# Patient Record
Sex: FEMALE | Race: AMERICAN INDIAN OR ALASKA NATIVE
[De-identification: names, ages, dates, MRNs, and addresses within clinical notes are randomized per-mention and may not be internally consistent; named-entity substitution may affect disease eponyms.]

---

## 2020-09-30 ENCOUNTER — HOSPITAL ENCOUNTER (EMERGENCY)
Dept: HOSPITAL 5 - ED | Age: 30
Discharge: LEFT BEFORE DISCHARGE | End: 2020-09-30
Payer: SELF-PAY

## 2020-09-30 VITALS — DIASTOLIC BLOOD PRESSURE: 55 MMHG | SYSTOLIC BLOOD PRESSURE: 127 MMHG

## 2020-09-30 DIAGNOSIS — Z53.21: ICD-10-CM

## 2020-09-30 DIAGNOSIS — N76.0: Primary | ICD-10-CM

## 2025-05-10 PROBLEM — A59.9 TRICHOMONIASIS: Status: ACTIVE | Noted: 2024-10-14

## 2025-05-10 PROBLEM — J45.20 MILD INTERMITTENT ASTHMA: Status: ACTIVE | Noted: 2024-10-14

## 2025-05-10 PROBLEM — Z86.19 HISTORY OF SYPHILIS: Status: ACTIVE | Noted: 2024-10-14

## 2025-05-10 PROBLEM — F41.1 GENERALIZED ANXIETY DISORDER: Chronic | Status: RESOLVED | Noted: 2023-09-27 | Resolved: 2025-05-10

## 2025-05-10 PROBLEM — F43.10 POSTTRAUMATIC STRESS DISORDER: Status: RESOLVED | Noted: 2023-09-27 | Resolved: 2025-05-10

## 2025-05-10 PROBLEM — A59.9 TRICHOMONIASIS: Status: RESOLVED | Noted: 2024-10-14 | Resolved: 2025-05-10

## 2025-05-10 PROBLEM — T14.91XA ATTEMPTED SUICIDE (MULTI): Status: ACTIVE | Noted: 2024-10-14

## 2025-05-10 PROBLEM — T39.1X1A: Status: ACTIVE | Noted: 2024-10-14

## 2025-05-10 PROBLEM — F41.1 GENERALIZED ANXIETY DISORDER: Chronic | Status: ACTIVE | Noted: 2023-09-27

## 2025-05-10 PROBLEM — F43.10 POSTTRAUMATIC STRESS DISORDER: Status: ACTIVE | Noted: 2023-09-27

## 2025-05-10 PROBLEM — F32.9 MAJOR DEPRESSIVE DISORDER: Chronic | Status: ACTIVE | Noted: 2023-09-27

## 2025-05-10 PROBLEM — F32.9 MAJOR DEPRESSIVE DISORDER: Chronic | Status: RESOLVED | Noted: 2023-09-27 | Resolved: 2025-05-10

## 2025-05-10 PROBLEM — I72.0 CAROTID PSEUDOANEURYSM: Status: ACTIVE | Noted: 2025-05-10
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
## 2025-05-13 ENCOUNTER — INITIAL PRENATAL (OUTPATIENT)
Dept: OBSTETRICS AND GYNECOLOGY | Facility: CLINIC | Age: 35
End: 2025-05-13
Payer: COMMERCIAL

## 2025-05-13 ENCOUNTER — APPOINTMENT (OUTPATIENT)
Dept: LAB | Facility: HOSPITAL | Age: 35
End: 2025-05-13
Payer: COMMERCIAL

## 2025-05-13 VITALS — SYSTOLIC BLOOD PRESSURE: 112 MMHG | WEIGHT: 189 LBS | BODY MASS INDEX: 33.57 KG/M2 | DIASTOLIC BLOOD PRESSURE: 69 MMHG

## 2025-05-13 DIAGNOSIS — Z72.0 TOBACCO USE: ICD-10-CM

## 2025-05-13 DIAGNOSIS — O09.299: ICD-10-CM

## 2025-05-13 DIAGNOSIS — I72.9 PSEUDOANEURYSM: ICD-10-CM

## 2025-05-13 DIAGNOSIS — F11.10: ICD-10-CM

## 2025-05-13 DIAGNOSIS — R87.619 ABNORMAL CERVICAL PAPANICOLAOU SMEAR, UNSPECIFIED ABNORMAL PAP FINDING: ICD-10-CM

## 2025-05-13 DIAGNOSIS — Z3A.01 6 WEEKS GESTATION OF PREGNANCY (HHS-HCC): Primary | ICD-10-CM

## 2025-05-13 DIAGNOSIS — O99.340 DEPRESSION AFFECTING PREGNANCY (HHS-HCC): ICD-10-CM

## 2025-05-13 DIAGNOSIS — Z32.01 PREGNANCY TEST POSITIVE (HHS-HCC): ICD-10-CM

## 2025-05-13 DIAGNOSIS — O99.311: ICD-10-CM

## 2025-05-13 DIAGNOSIS — F32.A DEPRESSION AFFECTING PREGNANCY (HHS-HCC): ICD-10-CM

## 2025-05-13 DIAGNOSIS — Z12.4 SCREENING FOR MALIGNANT NEOPLASM OF CERVIX: ICD-10-CM

## 2025-05-13 DIAGNOSIS — O09.91 SUPERVISION OF HIGH RISK PREGNANCY IN FIRST TRIMESTER (HHS-HCC): ICD-10-CM

## 2025-05-13 DIAGNOSIS — Z59.41 FOOD INSECURITY: ICD-10-CM

## 2025-05-13 DIAGNOSIS — Z86.19 HISTORY OF SYPHILIS: ICD-10-CM

## 2025-05-13 PROBLEM — T14.91XA ATTEMPTED SUICIDE (MULTI): Status: RESOLVED | Noted: 2024-10-14 | Resolved: 2025-05-13

## 2025-05-13 PROBLEM — T39.1X1A: Status: RESOLVED | Noted: 2024-10-14 | Resolved: 2025-05-13

## 2025-05-13 PROBLEM — I72.0 CAROTID PSEUDOANEURYSM: Status: RESOLVED | Noted: 2025-05-10 | Resolved: 2025-05-13

## 2025-05-13 LAB
ABO GROUP (TYPE) IN BLOOD: NORMAL
ANTIBODY SCREEN: NORMAL
ERYTHROCYTE [DISTWIDTH] IN BLOOD BY AUTOMATED COUNT: 12.5 % (ref 11.5–14.5)
HCT VFR BLD AUTO: 33.6 % (ref 36–46)
HGB BLD-MCNC: 11.1 G/DL (ref 12–16)
MCH RBC QN AUTO: 28.5 PG (ref 26–34)
MCHC RBC AUTO-ENTMCNC: 33 G/DL (ref 32–36)
MCV RBC AUTO: 86 FL (ref 80–100)
NRBC BLD-RTO: 0 /100 WBCS (ref 0–0)
PLATELET # BLD AUTO: 342 X10*3/UL (ref 150–450)
RBC # BLD AUTO: 3.9 X10*6/UL (ref 4–5.2)
REFLEX ADDED, ANEMIA PANEL: NORMAL
RH FACTOR (ANTIGEN D): NORMAL
WBC # BLD AUTO: 7.9 X10*3/UL (ref 4.4–11.3)

## 2025-05-13 PROCEDURE — 85027 COMPLETE CBC AUTOMATED: CPT

## 2025-05-13 PROCEDURE — 86850 RBC ANTIBODY SCREEN: CPT

## 2025-05-13 PROCEDURE — 99214 OFFICE O/P EST MOD 30 MIN: CPT | Mod: 25 | Performed by: ADVANCED PRACTICE MIDWIFE

## 2025-05-13 PROCEDURE — 86900 BLOOD TYPING SEROLOGIC ABO: CPT

## 2025-05-13 PROCEDURE — 96160 PT-FOCUSED HLTH RISK ASSMT: CPT | Performed by: ADVANCED PRACTICE MIDWIFE

## 2025-05-13 PROCEDURE — 99214 OFFICE O/P EST MOD 30 MIN: CPT | Performed by: ADVANCED PRACTICE MIDWIFE

## 2025-05-13 PROCEDURE — 86901 BLOOD TYPING SEROLOGIC RH(D): CPT

## 2025-05-13 PROCEDURE — 87491 CHLMYD TRACH DNA AMP PROBE: CPT | Performed by: ADVANCED PRACTICE MIDWIFE

## 2025-05-13 PROCEDURE — 83021 HEMOGLOBIN CHROMOTOGRAPHY: CPT

## 2025-05-13 PROCEDURE — 87661 TRICHOMONAS VAGINALIS AMPLIF: CPT | Performed by: ADVANCED PRACTICE MIDWIFE

## 2025-05-13 PROCEDURE — 99078 GROUP HEALTH EDUCATION: CPT | Performed by: ADVANCED PRACTICE MIDWIFE

## 2025-05-13 RX ORDER — PYRIDOXINE HCL (VITAMIN B6) 25 MG
25 TABLET ORAL 3 TIMES DAILY PRN
Qty: 90 TABLET | Refills: 3 | Status: SHIPPED | OUTPATIENT
Start: 2025-05-13 | End: 2025-06-12

## 2025-05-13 RX ORDER — IBUPROFEN 200 MG
1 TABLET ORAL EVERY 24 HOURS
Qty: 30 PATCH | Refills: 3 | Status: SHIPPED | OUTPATIENT
Start: 2025-05-13 | End: 2025-06-12

## 2025-05-13 RX ORDER — SERTRALINE HYDROCHLORIDE 25 MG/1
25 TABLET, FILM COATED ORAL DAILY
Qty: 90 TABLET | Refills: 1 | Status: SHIPPED | OUTPATIENT
Start: 2025-05-13 | End: 2026-05-13

## 2025-05-13 SDOH — ECONOMIC STABILITY: FOOD INSECURITY: WITHIN THE PAST 12 MONTHS, YOU WORRIED THAT YOUR FOOD WOULD RUN OUT BEFORE YOU GOT MONEY TO BUY MORE.: NEVER TRUE

## 2025-05-13 SDOH — ECONOMIC STABILITY: FOOD INSECURITY: WITHIN THE PAST 12 MONTHS, THE FOOD YOU BOUGHT JUST DIDN'T LAST AND YOU DIDN'T HAVE MONEY TO GET MORE.: SOMETIMES TRUE

## 2025-05-13 SDOH — ECONOMIC STABILITY - FOOD INSECURITY: FOOD INSECURITY: Z59.41

## 2025-05-13 ASSESSMENT — EDINBURGH POSTNATAL DEPRESSION SCALE (EPDS)
TOTAL SCORE: 14
I HAVE BEEN ABLE TO LAUGH AND SEE THE FUNNY SIDE OF THINGS: AS MUCH AS I ALWAYS COULD
I HAVE BLAMED MYSELF UNNECESSARILY WHEN THINGS WENT WRONG: YES, SOME OF THE TIME
I HAVE BEEN SO UNHAPPY THAT I HAVE BEEN CRYING: ONLY OCCASIONALLY
I HAVE LOOKED FORWARD WITH ENJOYMENT TO THINGS: RATHER LESS THAN I USED TO
I HAVE FELT SCARED OR PANICKY FOR NO GOOD REASON: YES, SOMETIMES
THINGS HAVE BEEN GETTING ON TOP OF ME: YES, SOMETIMES I HAVEN'T BEEN COPING AS WELL AS USUAL
I HAVE FELT SAD OR MISERABLE: YES, MOST OF THE TIME
I HAVE BEEN ANXIOUS OR WORRIED FOR NO GOOD REASON: HARDLY EVER
I HAVE BEEN SO UNHAPPY THAT I HAVE HAD DIFFICULTY SLEEPING: YES, SOMETIMES
THE THOUGHT OF HARMING MYSELF HAS OCCURRED TO ME: NEVER

## 2025-05-13 NOTE — LETTER
May 13, 2025     Patient: Cintia Justin   YOB: 1990   Date of Visit: 5/13/2025       To Whom It May Concern:    Cintia Justin was seen in my clinic on 5/13/2025 at 9:00 am. Please excuse Cintia for her absence from work on this day to make the appointment. Pt had a longer apt today. She did not get done until 1230PM    If you have any questions or concerns, please don't hesitate to call.         Sincerely,         CODI Huerta        CC: No Recipients

## 2025-05-14 ENCOUNTER — HOSPITAL ENCOUNTER (OUTPATIENT)
Dept: RADIOLOGY | Facility: CLINIC | Age: 35
Discharge: HOME | End: 2025-05-14
Payer: COMMERCIAL

## 2025-05-14 DIAGNOSIS — Z32.01 PREGNANCY TEST POSITIVE (HHS-HCC): ICD-10-CM

## 2025-05-14 DIAGNOSIS — O09.521 AMA (ADVANCED MATERNAL AGE) MULTIGRAVIDA 35+, FIRST TRIMESTER (HHS-HCC): ICD-10-CM

## 2025-05-14 DIAGNOSIS — O99.211 OBESITY AFFECTING PREGNANCY IN FIRST TRIMESTER (HHS-HCC): ICD-10-CM

## 2025-05-14 LAB
B-HCG SERPL-ACNC: ABNORMAL MIU/ML
C TRACH RRNA SPEC QL NAA+PROBE: NEGATIVE
EST. AVERAGE GLUCOSE BLD GHB EST-MCNC: 108 MG/DL
EST. AVERAGE GLUCOSE BLD GHB EST-SCNC: 6 MMOL/L
HBA1C MFR BLD: 5.4 %
HBV CORE AB SERPL QL IA: NORMAL
HBV SURFACE AB SERPL IA-ACNC: 50 MIU/ML
HBV SURFACE AG SERPL QL IA: NORMAL
HCV AB SERPL QL IA: NORMAL
HEMOGLOBIN A2: 2.7 % (ref 2–3.5)
HEMOGLOBIN A: 97 % (ref 95.8–98)
HEMOGLOBIN F: 0.3 % (ref 0–2)
HEMOGLOBIN IDENTIFICATION INTERPRETATION: NORMAL
HIV 1+2 AB+HIV1 P24 AG SERPL QL IA: NORMAL
N GONORRHOEA DNA SPEC QL PROBE+SIG AMP: NEGATIVE
PATH REVIEW-HGB IDENTIFICATION: NORMAL
RUBV IGG SERPL IA-ACNC: 3.39 INDEX
T PALLIDUM AB SER QL IA: NORMAL
T VAGINALIS RRNA SPEC QL NAA+PROBE: NEGATIVE

## 2025-05-14 PROCEDURE — 76801 OB US < 14 WKS SINGLE FETUS: CPT

## 2025-05-19 LAB
B-HCG SERPL-ACNC: ABNORMAL MIU/ML
EST. AVERAGE GLUCOSE BLD GHB EST-MCNC: 108 MG/DL
EST. AVERAGE GLUCOSE BLD GHB EST-SCNC: 6 MMOL/L
HBA1C MFR BLD: 5.4 %
HBV CORE AB SERPL QL IA: NORMAL
HBV SURFACE AB SERPL IA-ACNC: 50 MIU/ML
HBV SURFACE AG SERPL QL IA: NORMAL
HCV AB SERPL QL IA: NORMAL
HIV 1+2 AB+HIV1 P24 AG SERPL QL IA: NORMAL
RPR SER QL: ABNORMAL
RUBV IGG SERPL IA-ACNC: 3.39 INDEX
T PALLIDUM AB SER QL AGGL: REACTIVE
T PALLIDUM AB SER QL IA: POSITIVE

## 2025-05-20 ENCOUNTER — ROUTINE PRENATAL (OUTPATIENT)
Dept: OBSTETRICS AND GYNECOLOGY | Facility: CLINIC | Age: 35
End: 2025-05-20
Payer: COMMERCIAL

## 2025-05-20 VITALS — SYSTOLIC BLOOD PRESSURE: 113 MMHG | DIASTOLIC BLOOD PRESSURE: 74 MMHG | WEIGHT: 191 LBS | BODY MASS INDEX: 33.93 KG/M2

## 2025-05-20 DIAGNOSIS — F11.10: ICD-10-CM

## 2025-05-20 DIAGNOSIS — O26.891 VAGINAL DISCHARGE DURING PREGNANCY IN FIRST TRIMESTER (HHS-HCC): Primary | ICD-10-CM

## 2025-05-20 DIAGNOSIS — N89.8 VAGINAL DISCHARGE DURING PREGNANCY IN FIRST TRIMESTER (HHS-HCC): Primary | ICD-10-CM

## 2025-05-20 DIAGNOSIS — O99.331 MATERNAL TOBACCO USE IN FIRST TRIMESTER (HHS-HCC): ICD-10-CM

## 2025-05-20 DIAGNOSIS — O99.511 ASTHMA AFFECTING PREGNANCY IN FIRST TRIMESTER (HHS-HCC): ICD-10-CM

## 2025-05-20 DIAGNOSIS — Z3A.09 9 WEEKS GESTATION OF PREGNANCY (HHS-HCC): ICD-10-CM

## 2025-05-20 DIAGNOSIS — O21.9 NAUSEA AND VOMITING IN PREGNANCY PRIOR TO 22 WEEKS GESTATION: ICD-10-CM

## 2025-05-20 DIAGNOSIS — J45.909 ASTHMA AFFECTING PREGNANCY IN FIRST TRIMESTER (HHS-HCC): ICD-10-CM

## 2025-05-20 PROCEDURE — 99214 OFFICE O/P EST MOD 30 MIN: CPT | Mod: GC,TH

## 2025-05-20 PROCEDURE — 99214 OFFICE O/P EST MOD 30 MIN: CPT

## 2025-05-20 RX ORDER — METOCLOPRAMIDE 10 MG/1
10 TABLET ORAL 4 TIMES DAILY
Qty: 120 TABLET | Refills: 2 | Status: SHIPPED | OUTPATIENT
Start: 2025-05-20 | End: 2025-08-18

## 2025-05-20 RX ORDER — FLUCONAZOLE 150 MG/1
150 TABLET ORAL ONCE
Qty: 1 TABLET | Refills: 0 | Status: SHIPPED | OUTPATIENT
Start: 2025-05-20 | End: 2025-05-20

## 2025-05-20 RX ORDER — ALBUTEROL SULFATE 90 UG/1
2 INHALANT RESPIRATORY (INHALATION) EVERY 6 HOURS PRN
Qty: 18 G | Refills: 11 | Status: SHIPPED | OUTPATIENT
Start: 2025-05-20 | End: 2026-05-20

## 2025-05-20 ASSESSMENT — ENCOUNTER SYMPTOMS
CONSTITUTIONAL NEGATIVE: 0
DEPRESSION: 0
LOSS OF SENSATION IN FEET: 0
HEMATOLOGIC/LYMPHATIC NEGATIVE: 0
CARDIOVASCULAR NEGATIVE: 0
ALLERGIC/IMMUNOLOGIC NEGATIVE: 0
NEUROLOGICAL NEGATIVE: 0
MUSCULOSKELETAL NEGATIVE: 0
RESPIRATORY NEGATIVE: 0
EYES NEGATIVE: 0
GASTROINTESTINAL NEGATIVE: 0
ENDOCRINE NEGATIVE: 0
OCCASIONAL FEELINGS OF UNSTEADINESS: 0
PSYCHIATRIC NEGATIVE: 0

## 2025-05-20 ASSESSMENT — PATIENT HEALTH QUESTIONNAIRE - PHQ9
2. FEELING DOWN, DEPRESSED OR HOPELESS: NOT AT ALL
1. LITTLE INTEREST OR PLEASURE IN DOING THINGS: NOT AT ALL
SUM OF ALL RESPONSES TO PHQ9 QUESTIONS 1 AND 2: 0

## 2025-05-20 ASSESSMENT — COLUMBIA-SUICIDE SEVERITY RATING SCALE - C-SSRS
6. HAVE YOU EVER DONE ANYTHING, STARTED TO DO ANYTHING, OR PREPARED TO DO ANYTHING TO END YOUR LIFE?: NO
2. HAVE YOU ACTUALLY HAD ANY THOUGHTS OF KILLING YOURSELF?: NO
1. IN THE PAST MONTH, HAVE YOU WISHED YOU WERE DEAD OR WISHED YOU COULD GO TO SLEEP AND NOT WAKE UP?: NO

## 2025-05-20 NOTE — ASSESSMENT & PLAN NOTE
Urine culture ordered  Patient having persistent nausea, Reglan prescribed  Desires genetic testing, [facility] ordered  Orders:    Urine Culture; Future    [facility] Prequel Prenatal Screen; Future    metoclopramide (Reglan) 10 mg tablet; Take 1 tablet (10 mg) by mouth 4 times a day.

## 2025-05-20 NOTE — PROGRESS NOTES
OB Follow-up  2025         SUBJECTIVE    HPI: Cintia Justin is a 34 y.o.  at 9w0d here due to concern for yeast infection. Denies cramping or vaginal bleeding. Reports vaginal itching and thick white vaginal discharge for 2 days. She recently complete antibiotic course for UTI, states still intermittently have dysuria. Also having nausea, not improved with B6 and Unisom. Has not taken Percocet since last visit.      OBJECTIVE    Visit Vitals  /74   Wt 86.6 kg (191 lb)   LMP 2025   BMI 33.93 kg/m²   OB Status Pregnant   Smoking Status Every Day   BSA 1.96 m²      : Erythematous labia minora, thick white vaginal discharge at introitus    ASSESSMENT & PLAN    Cintia Justin is a 34 y.o.  at  9w0d here for the following concerns we addressed today:    Assessment & Plan  9 weeks gestation of pregnancy ([facility])  Urine culture ordered  Patient having persistent nausea, Reglan prescribed  Desires genetic testing, [facility] ordered  Orders:    Urine Culture; Future    [facility] Prequel Prenatal Screen; Future    metoclopramide (Reglan) 10 mg tablet; Take 1 tablet (10 mg) by mouth 4 times a day.    Vaginal discharge during pregnancy in first trimester ([facility])  Clinical exam consistent with yeast infection, vaginitis swab obtain and Diflucan ordered  Orders:    Vaginitis Gram Stain For Bacterial Vaginosis + Yeast    fluconazole (Diflucan) 150 mg tablet; Take 1 tablet (150 mg) by mouth 1 time for 1 dose. Repeat in 7 days if symptoms persist.    Asthma affecting pregnancy in first trimester ([facility])  Desires refill on rescue inhaler, ordered  Orders:    albuterol 90 mcg/actuation inhaler; Inhale 2 puffs every 6 hours if needed for wheezing.    Maternal tobacco use in first trimester ([facility])  On nicotine patch        Nausea and vomiting in pregnancy prior to 22 weeks gestation  Reglan prescribed       Percocet use disorder, mild  Patient previously intermittent taking Percocet, states  not taking since last visit         RTC as scheduled, next OB appt 6/3    Patient seen and evaluated with Dr. Michelle Mcgowan MD PGY-3

## 2025-05-20 NOTE — ASSESSMENT & PLAN NOTE
Clinical exam consistent with yeast infection, vaginitis swab obtain and Diflucan ordered  Orders:    Vaginitis Gram Stain For Bacterial Vaginosis + Yeast    fluconazole (Diflucan) 150 mg tablet; Take 1 tablet (150 mg) by mouth 1 time for 1 dose. Repeat in 7 days if symptoms persist.

## 2025-05-21 LAB — BV SCORE VAG QL: ABNORMAL

## 2025-05-22 ENCOUNTER — TELEPHONE (OUTPATIENT)
Dept: OBSTETRICS AND GYNECOLOGY | Facility: CLINIC | Age: 35
End: 2025-05-22
Payer: COMMERCIAL

## 2025-05-22 DIAGNOSIS — O23.41 URINARY TRACT INFECTION IN MOTHER DURING FIRST TRIMESTER OF PREGNANCY (HHS-HCC): Primary | ICD-10-CM

## 2025-05-22 DIAGNOSIS — O23.599 BACTERIAL VAGINOSIS IN PREGNANCY (HHS-HCC): ICD-10-CM

## 2025-05-22 DIAGNOSIS — B96.89 BACTERIAL VAGINOSIS IN PREGNANCY (HHS-HCC): ICD-10-CM

## 2025-05-22 RX ORDER — METRONIDAZOLE 7.5 MG/G
GEL VAGINAL DAILY
Qty: 70 G | Refills: 0 | Status: SHIPPED | OUTPATIENT
Start: 2025-05-22 | End: 2025-05-27

## 2025-05-22 RX ORDER — CEPHALEXIN 500 MG/1
500 CAPSULE ORAL 4 TIMES DAILY
Qty: 28 CAPSULE | Refills: 0 | Status: SHIPPED | OUTPATIENT
Start: 2025-05-22 | End: 2025-05-29

## 2025-05-22 NOTE — TELEPHONE ENCOUNTER
Pt notified of uti and of + bv and that rxs were sent----- Message from Nurse Mary Lou DOLAN sent at 5/21/2025 11:43 AM EDT -----  Regarding: test results  Contact: [phone]  Cintia Justin 1990  [phone]. Requesting urinalysis results. Thank you.

## 2025-05-23 LAB — BACTERIA UR CULT: ABNORMAL

## 2025-05-27 ENCOUNTER — TELEPHONE (OUTPATIENT)
Dept: GENETICS | Facility: CLINIC | Age: 35
End: 2025-05-27

## 2025-05-27 ENCOUNTER — TELEPHONE (OUTPATIENT)
Dept: OBSTETRICS AND GYNECOLOGY | Facility: CLINIC | Age: 35
End: 2025-05-27
Payer: COMMERCIAL

## 2025-05-27 ENCOUNTER — HOSPITAL ENCOUNTER (OUTPATIENT)
Dept: RADIOLOGY | Facility: CLINIC | Age: 35
Discharge: HOME | End: 2025-05-27
Payer: COMMERCIAL

## 2025-05-27 ENCOUNTER — CLINICAL SUPPORT (OUTPATIENT)
Dept: GENETICS | Facility: CLINIC | Age: 35
End: 2025-05-27
Payer: COMMERCIAL

## 2025-05-27 VITALS
BODY MASS INDEX: 34.14 KG/M2 | DIASTOLIC BLOOD PRESSURE: 75 MMHG | WEIGHT: 192.2 LBS | SYSTOLIC BLOOD PRESSURE: 113 MMHG | HEART RATE: 89 BPM

## 2025-05-27 DIAGNOSIS — O26.851 SPOTTING COMPLICATING PREGNANCY, FIRST TRIMESTER (HHS-HCC): ICD-10-CM

## 2025-05-27 DIAGNOSIS — O09.521 MULTIGRAVIDA OF ADVANCED MATERNAL AGE IN FIRST TRIMESTER (HHS-HCC): ICD-10-CM

## 2025-05-27 DIAGNOSIS — Z3A.01 6 WEEKS GESTATION OF PREGNANCY (HHS-HCC): ICD-10-CM

## 2025-05-27 PROCEDURE — 76816 OB US FOLLOW-UP PER FETUS: CPT | Performed by: OBSTETRICS & GYNECOLOGY

## 2025-05-27 PROCEDURE — 76816 OB US FOLLOW-UP PER FETUS: CPT

## 2025-05-27 PROCEDURE — GENMD PR GENETICS VISIT (MEDICAID/MEDICARE): Performed by: GENETIC COUNSELOR, MS

## 2025-05-27 NOTE — PROGRESS NOTES
Cintia Justin is a 34 y.o. old,  TAB5 (1 ectopic), female who was approximately 10 weeks and 0 days pregnant at the time of our appointment with an EDC of 25.  She was seen to discuss her genetic screening and testing options due to advanced maternal age.      PAST HISTORY:  The patient has a personal history of migraines, left ICA Pseudoaneurysm, GERD, and depression.  The patient has not had any screening or diagnostic testing for aneuploidy in her pregnancy thus far.    FAMILY HISTORY:  Medical and family histories were reviewed and the following concerns regarding this pregnancy were apparent:      Ms. Justin:  Son (from a previous relationship)- ventricular septal defect   Son (from a previous relationship)- G6PD deficiency   Maternal half-sister- migraines  Mother- heart disease, asthma, high blood pressure  Father- cancer, type unknown, diagnosed in his 60's,  in his 60's    Her partner, Damir:  Mother- diabetes     The remainder of the family history was negative for birth defects, intellectual disability, recurrent pregnancy loss, or recognized inherited conditions.  Consanguinity was denied.  The Pedigree is scanned in the Media tab and is available for a full review of the family history.      ANCESTRY:   The patient reported that she is of , , and  (countries of origin are unknown) ancestry.  She reported that her partner is of  ancestry.  Ashkenazi Pentecostal ancestry was denied.    We discussed the availability, benefits, and limitations of carrier screening for cystic fibrosis (CF), spinal muscular atrophy (SMA), and hemoglobinopathies/thalassemias. We reviewed the carrier frequencies of these conditions, varied clinical manifestations, and their autosomal recessive inheritance. The patient has already had negative carrier screening for hemoglobinopathies and thalassemias via CBC and hemoglobin electrophoresis and these  results were reviewed. If both members of the couple are found to be carriers for the same autosomal recessive condition, there is a 25% chance for an affected child and prenatal diagnosis would be available. Negative carrier screening does not rule out the possibility of being a carrier. [place] screening is available for CF, hemoglobinopathies, and thalassemias, and SMA.  We also discussed the availability of more expanded/pan ethnic carrier screening for additional, primarily autosomal recessive, conditions. We discussed the pros and cons of expanded carrier screening including the higher likelihood of being identified as a carrier for at least one condition on a larger panel. Approximately 4% of couples are found to be at risk to have a child with a genetic disorder based on this screening. In rare cases, expanded carrier screening results may have health implications for the tested individual.      After careful consideration, the patient declined all carrier screening.      COUNSELING:  The following information was discussed with your patient:    1. The general population risk of 3-5% for birth defects in every pregnancy.    2. Chromosomes, genes, non-disjunction, and common aneuploidies.    3.  Based on the age of 35 at EDC alone, at this gestation, the risk for the fetus to have Down syndrome is approximately 1 in 130.  The combined risk for the fetus to have Trisomy 21/18/13 is approximately 1 in 99.  This does not include copy number variation, mosaicism, single gene disorders, translocations, and marker chromosomes.     4. The availability, benefits and limitations of ultrasound study. An ultrasound study is recommended at 12-14 weeks gestation for assessment of nuchal translucency and again at 19-20 weeks gestation to survey fetal organs. An ultrasound study in the second trimester can identify 50% of Down syndrome cases and 80-90% of trisomy 18 or trisomy 13 cases.     5. The availability, benefits and  limitations of standard cell-free DNA screening.  We discussed the methodology for this screen, which includes using cell-free DNA obtained from a mother's blood (derived from the placenta) to screen for the presence of common chromosomal abnormalities. Depending on the laboratory used, there is a >99% sensitivity for Down syndrome, at least 97.4% sensitivity for trisomy 18, and at least 91% sensitivity for trisomy 13.  Specificity for these trisomies is >99%. Although sensitivity and specificity rates are high, in our experience the positive predictive value is dependent on many factors; whereas false negative results are rare.  In addition, anticoagulants, maternal chromosome abnormality, fibroids or malignancy may impact results and/or be associated with inconclusive or other abnormal findings.  This is not a diagnostic test.  Therefore, in the event of an abnormal result, prenatal diagnosis through amniocentesis is recommended to confirm the findings, if confirmation is desired.  Results take approximately 7-10 days.      6. The methods, benefits, limitations and risks of CVS.  There is an approximate 1 in 200 risk of complications including a 1 in 400 risk for miscarriage. The approximate 1% risk of confined placental mosaicism in CVS was discussed.     7. The methods, benefits, limitations and risks of amniocentesis.  There is an approximate 1 in 400 risk of complications including a 1 in 800 risk of miscarriage.    8. The patient has a personal history of a left ICA Pseudoaneurysm.  Pseudoaneurysms may be caused by trauma or surgical procedures.  Sometimes pseudoaneurysms are associated with genetic disorders like Marfan syndrome, Sukumar-Danlos syndrome, etc.  We recommend that the patient be evaluated by a vascular specialist to determine the appropriate management for the pseudoaneurysm  We recommend that the patient have genetic counseling in the  [facility] with genetic specialist in aneurysms.  The  "patient was scheduled for this evaluation on 7/7/25.      9. Additional Family History Information:  The patient reported that she has a son with Glucose-6-phosphate dehydrogenase (G6PD) deficiency and is a known carrier of G6PD deficiency. Up to 24% of  Americans have a mutation in G6PD. G6PD deficiency is an X-linked recessive disorder. Males, who have only one X chromosome will have G6PD deficiency with one altered copy of the gene. Females, who have two X chromosomes, need a mutation to occur in both copies of the gene to cause the disorder.  The patient reported that she a carrier of G6PD.  If the patient is a carrier of G6PD, there is a 50% chance for each pregnancy for sons to have G6PD deficiency and a 50% chance per each pregnancy for her daughters to be carriers of G6PD deficiency. If the patient has two genetic changes for G6PD deficiency, then all of her children would inherit a single G6PD mutation (and all of her sons would be affected and all of her daughters would be carriers).  This is assuming that their father does not carrier a G6PD mutation. \"The clinical expression of G6PD variants encompasses a spectrum of hemolytic syndromes. Affected patients are most often asymptomatic, but many patients have episodic anemia, while a few have chronic hemolysis. With most G6PD variants, hemolysis is induced in children and adults by exposure to drugs having a high redox potential (including the antimalarial drug primaquine and certain sulfa drugs) or to jc beans, selected infections, or metabolic abnormalities.\" (UpToDate 2018).  The patient was offered carrier screening for G6PD deficiency and declined.  The patient should share this information with her primary OBGYN and primary care provider. She should also share this information with all of her children's Pediatricians.     The patient's son (from a previous relationship) was born with a congenital heart defect. A congenital heart defect may be " an isolated finding or one feature of a chromosomal condition, single gene disorder, or multiple malformation syndrome. When isolated (occurring alone), congenital heart defects are often considered multifactorial in origin, involving a combination of genetic, environmental and unknown factors. If the defect is isolated, the risk for close relatives is increased.  If isolated, the risk for the couple's offspring to have a congenital heart defect is approximately 1-2% compared to the general population risk of 0.5-1%.  If the defect is due to a chromosomal condition, single gene disorder, or multiple malformation syndrome, the risk to relatives may be significantly higher.     The patient has a personal history of depression and asthma.  The patient has a family history of migraines, heart disease, asthma and high blood pressure.  The patient's partner has a family history of diabetes. Often, these conditions are due to a combination of genetic and environmental factors (which often remain unknown).  The risk to have them often depends on the amount and degree of affected family members.  It also depends on if an underlying genetic cause of these conditions can be identified.   With this history, the couple and their offspring are at an increased risk for the disorders in their respective families and this information should be shared with all relevant primary care providers.        DISPOSITION:  The patient stated that she understood the above information and elected to proceed with standard cell-free DNA screening to [facility] for common aneuploidies.  All carrier screening was declined.  Diagnostic testing in the form of CVS and genetic amniocentesis was declined.      Aneurysm genetic specialist Dr. Melissa Salazar was contacted (with the patient's permission) and recommended that the patient be evaluated in the  [facility] on 7/7.  In this clinic an echocardiogram is performed and any clinically indicated  genetic testing will be initiated.  We also recommend that the patient have a consultation with Dr. Tsering Brooks (following her genetic evaluation) to determine if any other vascular imaging is recommended.      We recommend a NT ultrasound at 12 weeks.  We recommend an anatomy ultrasound at 19 weeks.    Thank you for allowing us to participate in the care of your patient.  Should you or your patient have any questions, please do not hesitate to contact our office at [phone].    Licensed Genetic Counselor Stella He MS, [facility] spent 51 total minutes on the day of the encounter for patient care (35 minutes with patient, 16 minutes on pre/post patient care activities, including documentation).    Sincerely,    Stella He MS  Licensed Genetic Counselor    Reviewed by:  Dr. James Hurtado MD  Maternal Fetal Medicine Specialist

## 2025-05-27 NOTE — TELEPHONE ENCOUNTER
Pt walked into clinic stating she is bleeding and cramping. Pt states bleeding is bright red. Discussed with dr bain. Pt to be added on for ultrasound. Pt also has a genetics appointment. Discussed with genetics and ultrasound and pt.

## 2025-05-28 ENCOUNTER — LAB (OUTPATIENT)
Dept: LAB | Facility: HOSPITAL | Age: 35
End: 2025-05-28
Payer: COMMERCIAL

## 2025-05-28 ENCOUNTER — APPOINTMENT (OUTPATIENT)
Facility: HOSPITAL | Age: 35
End: 2025-05-28
Payer: COMMERCIAL

## 2025-05-28 DIAGNOSIS — O09.521 SUPERVISION OF ELDERLY MULTIGRAVIDA, FIRST TRIMESTER: Primary | ICD-10-CM

## 2025-05-28 LAB
CYTOLOGY CMNT CVX/VAG CYTO-IMP: NORMAL
HPV HR 12 DNA GENITAL QL NAA+PROBE: NEGATIVE
HPV HR GENOTYPES PNL CVX NAA+PROBE: NEGATIVE
HPV16 DNA SPEC QL NAA+PROBE: NEGATIVE
HPV18 DNA SPEC QL NAA+PROBE: NEGATIVE
LAB AP HPV GENOTYPE QUESTION: YES
LAB AP HPV HR: NORMAL
LAB AP PAP ADDITIONAL TESTS: NORMAL
LAB AP PREVIOUS ABNORMAL HISTORY: NORMAL
LABORATORY COMMENT REPORT: NORMAL
LMP START DATE: NORMAL
MENSTRUAL HX REPORTED: NORMAL
PATH REPORT.TOTAL CANCER: NORMAL

## 2025-06-03 ENCOUNTER — ROUTINE PRENATAL (OUTPATIENT)
Dept: OBSTETRICS AND GYNECOLOGY | Facility: CLINIC | Age: 35
End: 2025-06-03
Payer: COMMERCIAL

## 2025-06-03 VITALS
BODY MASS INDEX: 34.07 KG/M2 | WEIGHT: 191.8 LBS | HEART RATE: 91 BPM | SYSTOLIC BLOOD PRESSURE: 113 MMHG | DIASTOLIC BLOOD PRESSURE: 71 MMHG

## 2025-06-03 DIAGNOSIS — O26.891 VAGINAL DISCHARGE DURING PREGNANCY IN FIRST TRIMESTER (HHS-HCC): ICD-10-CM

## 2025-06-03 DIAGNOSIS — F32.A DEPRESSION AFFECTING PREGNANCY (HHS-HCC): ICD-10-CM

## 2025-06-03 DIAGNOSIS — N89.8 VAGINAL DISCHARGE DURING PREGNANCY IN FIRST TRIMESTER (HHS-HCC): ICD-10-CM

## 2025-06-03 DIAGNOSIS — O09.891 SUPERVISION OF OTHER HIGH RISK PREGNANCIES, FIRST TRIMESTER (HHS-HCC): ICD-10-CM

## 2025-06-03 DIAGNOSIS — O99.340 DEPRESSION AFFECTING PREGNANCY (HHS-HCC): ICD-10-CM

## 2025-06-03 DIAGNOSIS — O21.9 NAUSEA AND VOMITING IN PREGNANCY PRIOR TO 22 WEEKS GESTATION: ICD-10-CM

## 2025-06-03 DIAGNOSIS — O99.331 MATERNAL TOBACCO USE IN FIRST TRIMESTER (HHS-HCC): ICD-10-CM

## 2025-06-03 DIAGNOSIS — Z3A.11 11 WEEKS GESTATION OF PREGNANCY (HHS-HCC): Primary | ICD-10-CM

## 2025-06-03 PROCEDURE — 99213 OFFICE O/P EST LOW 20 MIN: CPT | Mod: TH | Performed by: ADVANCED PRACTICE MIDWIFE

## 2025-06-03 PROCEDURE — 99406 BEHAV CHNG SMOKING 3-10 MIN: CPT | Performed by: ADVANCED PRACTICE MIDWIFE

## 2025-06-03 PROCEDURE — 99408 AUDIT/DAST 15-30 MIN: CPT | Performed by: ADVANCED PRACTICE MIDWIFE

## 2025-06-03 PROCEDURE — 99213 OFFICE O/P EST LOW 20 MIN: CPT | Performed by: ADVANCED PRACTICE MIDWIFE

## 2025-06-03 RX ORDER — NAPROXEN SODIUM 220 MG/1
81 TABLET, FILM COATED ORAL NIGHTLY
Qty: 90 TABLET | Refills: 3 | Status: SHIPPED | OUTPATIENT
Start: 2025-06-03 | End: 2025-09-01

## 2025-06-03 ASSESSMENT — PAIN SCALES - GENERAL: PAINLEVEL_OUTOF10: 0 - NO PAIN

## 2025-06-03 ASSESSMENT — ENCOUNTER SYMPTOMS
CARDIOVASCULAR NEGATIVE: 0
CONSTITUTIONAL NEGATIVE: 0
HEMATOLOGIC/LYMPHATIC NEGATIVE: 0
GASTROINTESTINAL NEGATIVE: 0
ENDOCRINE NEGATIVE: 0
NEUROLOGICAL NEGATIVE: 0
ALLERGIC/IMMUNOLOGIC NEGATIVE: 0
MUSCULOSKELETAL NEGATIVE: 0
EYES NEGATIVE: 0
RESPIRATORY NEGATIVE: 0
PSYCHIATRIC NEGATIVE: 0

## 2025-06-03 ASSESSMENT — PATIENT HEALTH QUESTIONNAIRE - PHQ9
SUM OF ALL RESPONSES TO PHQ9 QUESTIONS 1 AND 2: 0
1. LITTLE INTEREST OR PLEASURE IN DOING THINGS: NOT AT ALL
2. FEELING DOWN, DEPRESSED OR HOPELESS: NOT AT ALL

## 2025-06-03 ASSESSMENT — PAIN - FUNCTIONAL ASSESSMENT: PAIN_FUNCTIONAL_ASSESSMENT: 0-10

## 2025-06-03 NOTE — PROGRESS NOTES
Subjective   Cintia Justin is a 34 y.o.  at 11w0d with a working estimated date of delivery of 2025, by Ultrasound who presents for a routine prenatal visit. She also has had thoughts of terminating pregnancy, had an appointment at , but then canceled it.     Patients stated mood is overall okay. She stopped taking the zoloft because she thought that may be contributing to her tiredness.     Patient reports that she has been taking reglan 4x a day and is still experiencing some nausea.     She is also still having thick discharge that feels like a yeast infection associated with external vaginal itching and burning.     Currently quit drinking alcohol. She stated that she thought about having a drink but thinks about what was said in our centering counseling group and decides not to. She also stopped taking percocet.    Patient reports changing nicorette patch once a week. She is experiencing less cravings and has cut down on smoking black and mild's.     She denies vaginal bleeding, abdominal pain, leakage of fluid.     Objective   Physical Exam  Weight: 87 kg (191 lb 12.8 oz), Pregravid BMI: 31.97  Expected Total Weight Gain: 5 kg (11 lb)-9 kg (19 lb)   BP: 113/71         Problem List Items Addressed This Visit       Depression affecting pregnancy ([facility])    Overview   EPDS 14 at new OB  Was on zoloft before, ran out of medication, would like refills; sent 25  Declines  provider referral   6/3: Stopped taking zoloft after a week due to feelings of tiredness; discussed with patient that the effectiveness of zoloft can take at least 4 weeks. Explained to patient that the tiredness is most likely due to her being in her 1st trimester of pregnancy rather than the side effects of medication.          Maternal tobacco use in first trimester ([facility])    Overview   Smoking a pack of black and milds daily  Interested in quitting  Nicorette patch rx sent   6/3: informed patient on correct  usage of patch; she is amendable to changing patch every 24 hours instead of once a week.          11 weeks gestation of pregnancy ([facility]) - Primary    Overview   Desired provider in labor: [] CNM  [] Physician   [] Either Acceptable  [x] Blood Products: [x] Yes, accepts [] No, needs counseling  [x] Initial BMI: 31.97   [x] Prenatal Labs: WNL  [x] Cervical Cancer Screening up to date: 5/13/25   [x] Rh status: B+  [x] Screen for IPV and Substance Use Risk: WNL at new OB  [x] Genetic Screening (cfDNA):  ordered 5/27  [] First Trimester Anatomy Screen (11-13.6 wks):  [x] Baby ASA (initiated): rx sent at 11w  [x] Pregnancy dated by: 8w US    [] Anatomy US: (19-20 wks)  [] Federal Sterilization consent signed (if indicated):  [] 1hr GCT at 24-28wks:  [] Rhogam (if indicated):   [] Fetal Surveillance (if indicated):  [] Tdap (27-32 wks, may be given up to 36 wks if initial window missed):   [] RSV (32-36 wks) (Sept. to end of Jan):     [] Feeding Intentions:  [] Postpartum Birth control method:   [] GBS at 36 - 37 wks:  [] 39 weeks discussion of IOL vs. Expectant management:  [] Mode of delivery ( anticipated ):           Relevant Medications    aspirin 81 mg chewable tablet    Other Relevant Orders    Vaginitis Gram Stain For Bacterial Vaginosis + Yeast    Nausea and vomiting in pregnancy prior to 22 weeks gestation    Overview   Taking reglan 4x a day but not consistent with vit B-6 and Unisom.   Discussed with patient that taking reglan alone will not resolve her nausea.   Patient amendable to start taking medications as prescribed.             Other Visit Diagnoses         Vaginal discharge during pregnancy in first trimester ([facility])        Relevant Medications    aspirin 81 mg chewable tablet    Other Relevant Orders    Vaginitis Gram Stain For Bacterial Vaginosis + Yeast            -Start bASA for PEC prophylaxis  -Anatomy US ordered for 6/17  -Reviewed reasons to call CNM on-call: vaginal bleeding, loss of  fluid, severe pelvic pain, or any questions/concerns  -RTC in 4 weeks or prn  *next visit: smoking cessation, mood    SANDOUS NAJJAR     I was present with the PA student who participated in the documentation of this note. I have personally seen and re-examined the patient and performed the medical decision-making components (assessment and plan of care). I have reviewed the PA student documentation and verified the findings in the note as written with additions or exceptions as stated in the body of this note.    CODI Huerta

## 2025-06-04 LAB — BV SCORE VAG QL: ABNORMAL

## 2025-06-04 NOTE — TELEPHONE ENCOUNTER
Called patient to disclose normal cell free DNA results; screened negative for Trisomy 13, Trisomy 18, Trisomy 21, and sex chromosome aneuploidies. <1 in 10,000 chance of fetus being affected with these syndromes. Reviewed limitations, including that there are other possible genetic conditions that could affect a fetus not covered by this testing. Patient elected to learn predicted fetal sex, which is female.    If additional concerns arise, patient was notified that diagnostic testing is still available throughout the pregnancy. She declined additional testing at this time.     Please reach out to me at [phone] if you or your patient have any additional questions.     Lauren Hamm MS, [facility]  Licensed Genetic Counselor

## 2025-06-04 NOTE — TELEPHONE ENCOUNTER
Left message for patient requesting call back to review additional questions she had about genetic results.    Sincerely,   Laurne Hamm MS, [facility]  Licensed and Certified Genetic Counselor   [phone]

## 2025-06-05 LAB
COMMENTS - MP RESULT TYPE: NORMAL
SCAN RESULT: NORMAL

## 2025-06-06 ENCOUNTER — TELEPHONE (OUTPATIENT)
Dept: OBSTETRICS AND GYNECOLOGY | Facility: CLINIC | Age: 35
End: 2025-06-06

## 2025-06-06 ENCOUNTER — TELEPHONE (OUTPATIENT)
Dept: OBSTETRICS AND GYNECOLOGY | Facility: CLINIC | Age: 35
End: 2025-06-06
Payer: COMMERCIAL

## 2025-06-06 DIAGNOSIS — B96.89 BV (BACTERIAL VAGINOSIS): Primary | ICD-10-CM

## 2025-06-06 DIAGNOSIS — N76.0 BV (BACTERIAL VAGINOSIS): Primary | ICD-10-CM

## 2025-06-06 RX ORDER — METRONIDAZOLE 500 MG/1
500 TABLET ORAL 2 TIMES DAILY
Qty: 14 TABLET | Refills: 0 | Status: SHIPPED | OUTPATIENT
Start: 2025-06-06 | End: 2025-06-13

## 2025-06-06 NOTE — TELEPHONE ENCOUNTER
Called pt to see if she was doing better than she was the last time I spoke with her. She was in fear that she was going to lose her employment do to morning sickness. 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 Told her to reach out if anything changes I would be happy to put her in a group

## 2025-06-06 NOTE — TELEPHONE ENCOUNTER
Pt notified----- Message from Lauryn Thompson sent at 6/6/2025  8:45 AM EDT -----  BV; metronidazole sent   ----- Message -----  From: [facility] Results In  Sent: 6/4/2025   3:00 PM EDT  To: CODI Huerta

## 2025-06-16 ENCOUNTER — TELEPHONE (OUTPATIENT)
Dept: OBSTETRICS AND GYNECOLOGY | Facility: CLINIC | Age: 35
End: 2025-06-16
Payer: COMMERCIAL

## 2025-06-16 ENCOUNTER — ROUTINE PRENATAL (OUTPATIENT)
Dept: OBSTETRICS AND GYNECOLOGY | Facility: CLINIC | Age: 35
End: 2025-06-16
Payer: COMMERCIAL

## 2025-06-16 VITALS — SYSTOLIC BLOOD PRESSURE: 105 MMHG | DIASTOLIC BLOOD PRESSURE: 69 MMHG | WEIGHT: 189.9 LBS | BODY MASS INDEX: 33.73 KG/M2

## 2025-06-16 DIAGNOSIS — O26.891 VAGINAL DISCHARGE DURING PREGNANCY IN FIRST TRIMESTER (HHS-HCC): ICD-10-CM

## 2025-06-16 DIAGNOSIS — Z3A.12 12 WEEKS GESTATION OF PREGNANCY (HHS-HCC): ICD-10-CM

## 2025-06-16 DIAGNOSIS — J30.2 SEASONAL ALLERGIES: ICD-10-CM

## 2025-06-16 DIAGNOSIS — O23.41 URINARY TRACT INFECTION IN MOTHER DURING FIRST TRIMESTER OF PREGNANCY (HHS-HCC): ICD-10-CM

## 2025-06-16 DIAGNOSIS — N89.8 VAGINAL DISCHARGE DURING PREGNANCY IN FIRST TRIMESTER (HHS-HCC): ICD-10-CM

## 2025-06-16 DIAGNOSIS — O20.9 VAGINAL BLEEDING BEFORE 22 WEEKS GESTATION (HHS-HCC): ICD-10-CM

## 2025-06-16 DIAGNOSIS — Z34.81 ENCOUNTER FOR SUPERVISION OF OTHER NORMAL PREGNANCY IN FIRST TRIMESTER: Primary | ICD-10-CM

## 2025-06-16 PROBLEM — F11.21 OPIOID USE DISORDER, SEVERE, IN EARLY REMISSION: Status: ACTIVE | Noted: 2023-09-27

## 2025-06-16 PROBLEM — I72.9 PSEUDOANEURYSM: Status: ACTIVE | Noted: 2019-09-13

## 2025-06-16 PROCEDURE — 99213 OFFICE O/P EST LOW 20 MIN: CPT | Mod: TH | Performed by: ADVANCED PRACTICE MIDWIFE

## 2025-06-16 PROCEDURE — 99213 OFFICE O/P EST LOW 20 MIN: CPT | Performed by: ADVANCED PRACTICE MIDWIFE

## 2025-06-16 RX ORDER — CETIRIZINE HYDROCHLORIDE 10 MG/1
10 TABLET ORAL DAILY
Qty: 30 TABLET | Refills: 11 | Status: SHIPPED | OUTPATIENT
Start: 2025-06-16 | End: 2026-06-16

## 2025-06-16 ASSESSMENT — ENCOUNTER SYMPTOMS
MUSCULOSKELETAL NEGATIVE: 0
HEMATOLOGIC/LYMPHATIC NEGATIVE: 0
CARDIOVASCULAR NEGATIVE: 0
GASTROINTESTINAL NEGATIVE: 0
NEUROLOGICAL NEGATIVE: 0
ENDOCRINE NEGATIVE: 0
EYES NEGATIVE: 0
PSYCHIATRIC NEGATIVE: 0
RESPIRATORY NEGATIVE: 0
CONSTITUTIONAL NEGATIVE: 0
ALLERGIC/IMMUNOLOGIC NEGATIVE: 0

## 2025-06-16 NOTE — LETTER
June 16, 2025     Patient: Cintia Justin   YOB: 1990   Date of Visit: 6/16/2025       To Whom It May Concern:    Cintia Justin was seen in my clinic on 6/16/2025 at 4:15 pm. Please excuse Cintia for her absence from work on this day to make the appointment.    If you have any questions or concerns, please don't hesitate to call.         Sincerely,         Hannah Wahl, APRN-MAXWELL, APRN-CNP

## 2025-06-16 NOTE — TELEPHONE ENCOUNTER
Call from pt   12.6 wks  and reports since last Thursday she has been cramping and bleeding but today pain has caused her to leave work. She  sees blood when she wipes. Cramps are in lower back and legs. Pt discussed with Hannah Wahl CNM and pt to come in now- has an appt tomorrow but will see today.

## 2025-06-16 NOTE — PROGRESS NOTES
Subjective   Cintia Justin is a 34 y.o.  at 12w6d with a working estimated date of delivery of 2025, by Ultrasound who presents for a problem prenatal visit    Having bleeding and cramping. Cramping was very uncomfortable today, had to leave work. Had heavier bleeding last week on Thursday, now spotting. Known TRINH on US    Objective   Physical Exam  Weight: 86.1 kg (189 lb 14.4 oz)  TW.491 kg (9 lb 14.4 oz)   BP: 105/69 (pluse-93)  Fetal Heart Rate: 150    Physical Exam  Constitutional:       Appearance: Normal appearance.   Genitourinary:      Vulva and urethral meatus normal.      No lesions in the vagina.      Genitourinary Comments: No evidence of vaginal bleeding      Right Labia: No rash, tenderness, lesions or skin changes.     Left Labia: No tenderness, lesions, skin changes or rash.     Vaginal discharge present.      No vaginal erythema, tenderness, bleeding, ulceration or granulation tissue.      No vaginal prolapse present.     No vaginal atrophy present.     Cervix is parous.      Cervical friability present.      No cervical discharge or lesion.      Pelvic exam was performed with patient in the lithotomy position.   Pulmonary:      Effort: Pulmonary effort is normal.   Neurological:      Mental Status: She is alert.   Psychiatric:         Mood and Affect: Mood normal.         Behavior: Behavior normal. Behavior is cooperative.   Vitals reviewed.     Assessment/Plan   Problem List Items Addressed This Visit          Pregnancy    Vaginal bleeding before 22 weeks gestation ([facility])    12 weeks gestation of pregnancy ([facility])    Overview   Desired provider in labor: [] CNM  [] Physician   [] Either Acceptable  [x] Blood Products: [x] Yes, accepts [] No, needs counseling  [x] Initial BMI: 31.97   [x] Prenatal Labs: WNL  [x] Cervical Cancer Screening up to date: 25   [x] Rh status: B+  [x] Screen for IPV and Substance Use Risk: WNL at new OB  [x] Genetic Screening (cfDNA):   ordered 5/27  [] First Trimester Anatomy Screen (11-13.6 wks):  [x] Baby ASA (initiated): rx sent at 11w  [x] Pregnancy dated by: 8w US    [] Anatomy US: (19-20 wks)  [] Federal Sterilization consent signed (if indicated):  [] 1hr GCT at 24-28wks:  [] Rhogam (if indicated):   [] Fetal Surveillance (if indicated):  [] Tdap (27-32 wks, may be given up to 36 wks if initial window missed):   [] RSV (32-36 wks) (Sept. to end of Jan):     [] Feeding Intentions:  [] Postpartum Birth control method:   [] GBS at 36 - 37 wks:  [] 39 weeks discussion of IOL vs. Expectant management:  [] Mode of delivery ( anticipated ):           Relevant Medications    cetirizine (ZyrTEC) 10 mg tablet    Other Relevant Orders    Vaginitis Gram Stain For Bacterial Vaginosis + Yeast    Urine culture    Trichomonas vaginalis, Amplified     Other Visit Diagnoses         Encounter for supervision of other normal pregnancy in first trimester    -  Primary    Relevant Medications    cetirizine (ZyrTEC) 10 mg tablet    Other Relevant Orders    Vaginitis Gram Stain For Bacterial Vaginosis + Yeast    Urine culture    Trichomonas vaginalis, Amplified      Vaginal discharge during pregnancy in first trimester ([facility])        Relevant Orders    Vaginitis Gram Stain For Bacterial Vaginosis + Yeast    C. trachomatis / N. gonorrhoeae, Amplified, Urogenital    Trichomonas vaginalis, Amplified      Urinary tract infection in mother during first trimester of pregnancy ([facility])        Relevant Orders    Urine culture      Seasonal allergies        Relevant Medications    cetirizine (ZyrTEC) 10 mg tablet        GC/CT, trich, BV, yeast swab obtained  Hx UTI, never took meds, urine cx obtained  Known TRINH on ultrasound, discussed vaginal bleeding in this setting  No evidence of bleeding on exam today, reassurance provided  Reviewed warning signs and when to call provider  Follow up as scheduled for routine OB care    Hannah Wahl, RASHMI-AUSTINM, APRN-CNP

## 2025-06-17 ENCOUNTER — HOSPITAL ENCOUNTER (OUTPATIENT)
Dept: RADIOLOGY | Facility: CLINIC | Age: 35
Discharge: HOME | End: 2025-06-17
Payer: COMMERCIAL

## 2025-06-17 ENCOUNTER — APPOINTMENT (OUTPATIENT)
Facility: HOSPITAL | Age: 35
End: 2025-06-17
Payer: COMMERCIAL

## 2025-06-17 ENCOUNTER — ROUTINE PRENATAL (OUTPATIENT)
Dept: OBSTETRICS AND GYNECOLOGY | Facility: CLINIC | Age: 35
End: 2025-06-17
Payer: COMMERCIAL

## 2025-06-17 ENCOUNTER — SOCIAL WORK (OUTPATIENT)
Dept: PEDIATRICS | Facility: CLINIC | Age: 35
End: 2025-06-17

## 2025-06-17 VITALS
WEIGHT: 190.4 LBS | BODY MASS INDEX: 33.82 KG/M2 | HEART RATE: 89 BPM | SYSTOLIC BLOOD PRESSURE: 114 MMHG | DIASTOLIC BLOOD PRESSURE: 75 MMHG

## 2025-06-17 DIAGNOSIS — O99.340 DEPRESSION AFFECTING PREGNANCY (HHS-HCC): ICD-10-CM

## 2025-06-17 DIAGNOSIS — Z3A.13 13 WEEKS GESTATION OF PREGNANCY (HHS-HCC): Primary | ICD-10-CM

## 2025-06-17 DIAGNOSIS — I72.9 PSEUDOANEURYSM: ICD-10-CM

## 2025-06-17 DIAGNOSIS — J45.909 ASTHMA AFFECTING PREGNANCY IN SECOND TRIMESTER (HHS-HCC): ICD-10-CM

## 2025-06-17 DIAGNOSIS — O20.9 VAGINAL BLEEDING BEFORE 22 WEEKS GESTATION (HHS-HCC): ICD-10-CM

## 2025-06-17 DIAGNOSIS — O99.512 ASTHMA AFFECTING PREGNANCY IN SECOND TRIMESTER (HHS-HCC): ICD-10-CM

## 2025-06-17 DIAGNOSIS — O99.211 OBESITY AFFECTING PREGNANCY IN FIRST TRIMESTER (HHS-HCC): ICD-10-CM

## 2025-06-17 DIAGNOSIS — O99.331 MATERNAL TOBACCO USE IN FIRST TRIMESTER (HHS-HCC): ICD-10-CM

## 2025-06-17 DIAGNOSIS — Z71.6 ENCOUNTER FOR SMOKING CESSATION COUNSELING: ICD-10-CM

## 2025-06-17 DIAGNOSIS — Z59.41 FOOD INSECURITY: ICD-10-CM

## 2025-06-17 DIAGNOSIS — F32.A DEPRESSION AFFECTING PREGNANCY (HHS-HCC): ICD-10-CM

## 2025-06-17 DIAGNOSIS — O09.521 AMA (ADVANCED MATERNAL AGE) MULTIGRAVIDA 35+, FIRST TRIMESTER (HHS-HCC): ICD-10-CM

## 2025-06-17 LAB
C TRACH RRNA SPEC QL NAA+PROBE: NOT DETECTED
COMMENTS - MP RESULT TYPE: NORMAL
N GONORRHOEA RRNA SPEC QL NAA+PROBE: NOT DETECTED
QUEST GC CT AMPLIFIED (ALWAYS MESSAGE): NORMAL
SCAN RESULT: NORMAL
T VAGINALIS RRNA SPEC QL NAA+PROBE: NOT DETECTED

## 2025-06-17 PROCEDURE — 76816 OB US FOLLOW-UP PER FETUS: CPT

## 2025-06-17 PROCEDURE — 99214 OFFICE O/P EST MOD 30 MIN: CPT | Mod: 25,GC,TH | Performed by: STUDENT IN AN ORGANIZED HEALTH CARE EDUCATION/TRAINING PROGRAM

## 2025-06-17 PROCEDURE — 99214 OFFICE O/P EST MOD 30 MIN: CPT | Performed by: STUDENT IN AN ORGANIZED HEALTH CARE EDUCATION/TRAINING PROGRAM

## 2025-06-17 PROCEDURE — 76813 OB US NUCHAL MEAS 1 GEST: CPT

## 2025-06-17 PROCEDURE — 99406 BEHAV CHNG SMOKING 3-10 MIN: CPT | Mod: 25,GC | Performed by: STUDENT IN AN ORGANIZED HEALTH CARE EDUCATION/TRAINING PROGRAM

## 2025-06-17 PROCEDURE — 99406 BEHAV CHNG SMOKING 3-10 MIN: CPT | Performed by: STUDENT IN AN ORGANIZED HEALTH CARE EDUCATION/TRAINING PROGRAM

## 2025-06-17 RX ORDER — DIPHENHYDRAMINE HCL 25 MG
4 CAPSULE ORAL AS NEEDED
Qty: 100 EACH | Refills: 0 | Status: SHIPPED | OUTPATIENT
Start: 2025-06-17 | End: 2025-07-17

## 2025-06-17 RX ORDER — FLUTICASONE PROPIONATE 110 UG/1
1 AEROSOL, METERED RESPIRATORY (INHALATION)
Qty: 12 G | Refills: 11 | Status: SHIPPED | OUTPATIENT
Start: 2025-06-17 | End: 2026-06-17

## 2025-06-17 RX ORDER — BUDESONIDE AND FORMOTEROL FUMARATE DIHYDRATE 160; 4.5 UG/1; UG/1
2 AEROSOL RESPIRATORY (INHALATION)
Qty: 10.2 G | Refills: 11 | Status: CANCELLED | OUTPATIENT
Start: 2025-06-17 | End: 2026-06-17

## 2025-06-17 SDOH — ECONOMIC STABILITY - FOOD INSECURITY: FOOD INSECURITY: Z59.41

## 2025-06-17 NOTE — LETTER
June 17, 2025     Patient: Cintia Justin   YOB: 1990   Date of Visit: 6/17/2025       To Whom It May Concern:    Cintia Justin was seen in my clinic on 6/17/2025 at 8:30 am. Please excuse Cintia for her absence from work on this day to make the appointment.    If you have any questions or concerns, please don't hesitate to call.         Sincerely,         Isabel Yoo MD        CC: No Recipients

## 2025-06-17 NOTE — PROGRESS NOTES
Ob Visit  25     SUBJECTIVE      HPI: Cintia Justin is a 34 y.o.  at 13w0d here for RPNV.  She denies cramping/contractions, bleeding, or LOF. Patient reports allergies bothering her and asthma flairing due to her allergies. Recently rx'd zyrtec, but not yet taking it. Reports mood is okay FOB recently incarcerated. Trying to quit smoking, but very stressed and struggling to cut back.     Pregnancy notable for:  - asthma  - tobacco use  - depression  - h/o syphilis, tx'd in   - pseudoaneurysm; L distal cervical ICA - genetics in [facility] syd'd , needs neuro, previously cleared by [facility] neuro for  or CS    OBJECTIVE  Visit Vitals  /75   Pulse 89   Wt 86.4 kg (190 lb 6.4 oz)   LMP 2025   BMI 33.82 kg/m²   OB Status Pregnant   Smoking Status Every Day   BSA 1.96 m²            ASSESSMENT & PLAN    Cintia Justin is a 34 y.o.  at 13w0d here for the following concerns we addressed today:    Problem List Items Addressed This Visit       Asthma affecting pregnancy in second trimester ([facility])    Overview   On albuterol   Poor control - added flovent          Current Assessment & Plan   - poor control, using albuterol multiple times daily  - rx'd flovent with spacer   - rx'd peak flow meter  - discussed smoking cessation and correlation to asthma control         Relevant Medications    fluticasone (Flovent HFA) 110 mcg/actuation inhaler    Other Relevant Orders    Aerochamber Spacer Device    Peak flow    Pseudoaneurysm    Overview   Per previous documentation: Left ICA Pseudoaneurysm (cleared by [facility] Neuro for SVB or C/S) --> reviewed with Dr Verduzco, will place Neuro referral, no need for MFM referral at the moment, Neuro referral placed  -MFM note: We recommend that the patient have genetic counseling in the  [facility] with genetic specialist in aneurysms. The patient was scheduled for this evaluation on 25.     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 Explained to patient that the tiredness is most likely due to her being in her 1st trimester of pregnancy rather than the side effects of medication.          Current Assessment & Plan   - not taking zoloft  - mood overall okay  - provided therapy resources         Maternal tobacco use in first trimester ([facility])    Overview   Smoking a pack of black and milds daily  Interested in quitting  Nicorette patch rx sent   6/3: informed patient on correct usage of patch; she is amendable to changing patch every 24 hours instead of once a week.          Current Assessment & Plan   - using patch without much success at cutting back, lots of life stressors  - rx'd gum and ref to quit line  - counseled on smoking cessation for 10 minutes         Relevant Medications    nicotine polacrilex (Nicorette) 4 mg gum    13 weeks gestation of pregnancy ([facility]) - Primary    Overview   Desired provider in labor: [] CNM  [] Physician   [] Either Acceptable  [x] Blood Products: [x] Yes, accepts [] No, needs counseling  [x] Initial BMI: 31.97   [x] Prenatal Labs: WNL  [x] Cervical Cancer Screening up to date: 5/13/25   [x] Rh status: B+  [x] Screen for IPV and Substance Use Risk: WNL at new OB  [x] Genetic Screening (cfDNA): risk reducing, XX  [] First Trimester Anatomy Screen (11-13.6 wks): 6/17  [x] Baby ASA (initiated): rx sent at 11w  [x] Pregnancy dated by: 8w US    [] Anatomy US: (19-20 wks)  []  Fetal echo for h/o child with VSD  [] Federal Sterilization consent signed (if indicated):  [] 1hr GCT at 24-28wks:  [] Rhogam (if indicated):   [] Fetal Surveillance (if indicated):  [] Tdap (27-32 wks, may be given up to 36 wks if initial window missed):   [] RSV (32-36 wks) (Sept. to end of Jan):     [] Feeding Intentions:  [] Postpartum Birth control method:   [] GBS at 36 - 37 wks:  [] 39 weeks discussion of IOL vs. Expectant management:  [] Mode of delivery ( anticipated ):           Current Assessment & Plan   - care up to date  - risk reducing cf DNA  - NT today  - taking ld ASA         Vaginal bleeding before 22 weeks gestation ([facility])    Current Assessment & Plan   - minor spotting this week, now resolved          Other Visit Diagnoses         Encounter for smoking cessation counseling                  RTC in 4 weeks    Seen and d/w Dr. Michelle Yoo MD

## 2025-06-17 NOTE — PROGRESS NOTES
MATT Consult: Support Visit     GA: 13w0d  LENCHO: 12/23/25    Home Address: [id number] [place] [id number]  Phone: [phone]    MATT met with patient to introduce self, and SW role.  Ms. Justin is requesting assistance with utilities.  The electricity is her home was disconnected yesterday due to non-payment.  The current bill is $592.  SW directed patient to [employer] to complete the medical certification form.  The light bill is in patient's nineteen year old sons name.  Patient stated she is also several months behind on her rent.  Current outstanding balance is approximately $2600.  She's concerned her landlord plans to start the eviction process soon.  SW provided shelter resources this visit.  Household receives SNAP benefits.  A [employer] referral has been completed.  According to patient, she has a [employer] appointment this afternoon.  Ms. Justin expressed concern over food storage since her power is currently off.  SW provided summer lunch program info as well.  Utility assistance/PRC application provided this visit.  Patient is employed, but has decreased her hours significantly due to not feeling since becoming pregnant.  She lives at home with her three children ages 19, 14, and three.  MATT met with [employer] staff to request assistance with additional resources for this patient     MATT provided contact info, and encouraged patient to reach out for further assistance if needed    Kaci ARZATE

## 2025-06-17 NOTE — ASSESSMENT & PLAN NOTE
- poor control, using albuterol multiple times daily  - rx'd flovent with spacer   - rx'd peak flow meter  - discussed smoking cessation and correlation to asthma control

## 2025-06-17 NOTE — PROGRESS NOTES
[employer]  Diet Recommendation 1: MyPlate  Diet Recommendation 2: Healthy Eating  Nutrition Goals Stated: Shellfish  Household Size: 4 Members (Max/[place])  Interventions: Referral Number: 1st 6 Mo Referral 6 Mos  Interventions: Visit Number: 1 of 6 Visits - Max 6 Visits/Referral Each 6 Mo Period  Other Interventions: [facility] Food Resources  Education Today: MyPlate Meals  Grains: 0-25% Whole  Fruit: % Fresh  Vegetables: % Fresh  Proteins: 1-2 Plant-based Items  Dairy: 0-25% Lowfat  Originating Site of Referral Order: Lauryn Teresa  Initials of RD Assisting Today: KATHIE

## 2025-06-17 NOTE — ASSESSMENT & PLAN NOTE
- using patch without much success at cutting back, lots of life stressors  - rx'd gum and ref to quit line  - counseled on smoking cessation for 10 minutes

## 2025-06-18 ENCOUNTER — TELEPHONE (OUTPATIENT)
Dept: OBSTETRICS AND GYNECOLOGY | Facility: CLINIC | Age: 35
End: 2025-06-18
Payer: COMMERCIAL

## 2025-06-18 NOTE — TELEPHONE ENCOUNTER
Pt walked into clinic thinking she may have pink eye. Pt states she ws seen yesterday and given allergy medication but her eyes are worse and crusted closed in morning and eyes and face hurt worse than yesterday. Discussed with Dr Jordan and pt advised to go to urgent care but to check with insurance to make sure she goes to right urgent care that is covered by her insurance. Pt agreeable

## 2025-06-19 LAB
BACTERIA UR CULT: NORMAL
BV SCORE VAG QL: NORMAL

## 2025-06-20 DIAGNOSIS — N39.0 URINARY TRACT INFECTION WITHOUT HEMATURIA, SITE UNSPECIFIED: ICD-10-CM

## 2025-06-20 DIAGNOSIS — Z3A.13 13 WEEKS GESTATION OF PREGNANCY (HHS-HCC): ICD-10-CM

## 2025-06-20 DIAGNOSIS — O09.91 SUPERVISION OF HIGH RISK PREGNANCY IN FIRST TRIMESTER (HHS-HCC): Primary | ICD-10-CM

## 2025-06-20 PROBLEM — R11.2 NAUSEA AND VOMITING: Status: ACTIVE | Noted: 2024-10-14

## 2025-06-20 PROBLEM — N89.8 VAGINAL DISCHARGE: Status: ACTIVE | Noted: 2024-10-14

## 2025-06-26 DIAGNOSIS — I72.9 PSEUDOANEURYSM: Primary | ICD-10-CM

## 2025-07-07 ENCOUNTER — APPOINTMENT (OUTPATIENT)
Dept: GENETICS | Facility: CLINIC | Age: 35
End: 2025-07-07
Payer: COMMERCIAL

## 2025-07-08 ENCOUNTER — TELEPHONE (OUTPATIENT)
Dept: OBSTETRICS AND GYNECOLOGY | Facility: CLINIC | Age: 35
End: 2025-07-08
Payer: COMMERCIAL

## 2025-07-08 ENCOUNTER — ROUTINE PRENATAL (OUTPATIENT)
Dept: OBSTETRICS AND GYNECOLOGY | Facility: CLINIC | Age: 35
End: 2025-07-08
Payer: COMMERCIAL

## 2025-07-08 VITALS — BODY MASS INDEX: 34.35 KG/M2 | WEIGHT: 193.4 LBS | SYSTOLIC BLOOD PRESSURE: 103 MMHG | DIASTOLIC BLOOD PRESSURE: 67 MMHG

## 2025-07-08 DIAGNOSIS — Z3A.16 16 WEEKS GESTATION OF PREGNANCY (HHS-HCC): Primary | ICD-10-CM
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
## 2025-07-08 PROCEDURE — 99213 OFFICE O/P EST LOW 20 MIN: CPT | Mod: TH | Performed by: ADVANCED PRACTICE MIDWIFE

## 2025-07-08 PROCEDURE — 99213 OFFICE O/P EST LOW 20 MIN: CPT | Performed by: ADVANCED PRACTICE MIDWIFE

## 2025-07-08 ASSESSMENT — ENCOUNTER SYMPTOMS
MUSCULOSKELETAL NEGATIVE: 0
OCCASIONAL FEELINGS OF UNSTEADINESS: 0
PSYCHIATRIC NEGATIVE: 0
NEUROLOGICAL NEGATIVE: 0
CARDIOVASCULAR NEGATIVE: 0
CONSTITUTIONAL NEGATIVE: 0
LOSS OF SENSATION IN FEET: 0
RESPIRATORY NEGATIVE: 0
ALLERGIC/IMMUNOLOGIC NEGATIVE: 0
EYES NEGATIVE: 0
ENDOCRINE NEGATIVE: 0
HEMATOLOGIC/LYMPHATIC NEGATIVE: 0
DEPRESSION: 0
GASTROINTESTINAL NEGATIVE: 0

## 2025-07-08 ASSESSMENT — COLUMBIA-SUICIDE SEVERITY RATING SCALE - C-SSRS
6. HAVE YOU EVER DONE ANYTHING, STARTED TO DO ANYTHING, OR PREPARED TO DO ANYTHING TO END YOUR LIFE?: NO
1. IN THE PAST MONTH, HAVE YOU WISHED YOU WERE DEAD OR WISHED YOU COULD GO TO SLEEP AND NOT WAKE UP?: NO
2. HAVE YOU ACTUALLY HAD ANY THOUGHTS OF KILLING YOURSELF?: NO

## 2025-07-08 ASSESSMENT — PATIENT HEALTH QUESTIONNAIRE - PHQ9
2. FEELING DOWN, DEPRESSED OR HOPELESS: NOT AT ALL
SUM OF ALL RESPONSES TO PHQ9 QUESTIONS 1 AND 2: 0
1. LITTLE INTEREST OR PLEASURE IN DOING THINGS: NOT AT ALL

## 2025-07-08 NOTE — PROGRESS NOTES
Ob Problem Visit  25     SUBJECTIVE    HPI: Cintia Justin is a 34 y.o.  at 16w0d added onto the schedule today for   Chief Complaint   Patient presents with    Routine Prenatal Visit     Patient due to she is pregnant and having very bad cramps lower abdominal been going on since 11 am this morning. Legs hurt bad they feel heavy and her feet was swallow really bad on Saturday went down some  today. Patient been having headaches since Thursday no falls      Has been taking ES Tylenol with some relief. Denies any chest pain, SOB, RUQ pain, or visual disturbances.     OBJECTIVE  Visit Vitals  /67   Wt 87.7 kg (193 lb 6.4 oz)   LMP 2025   BMI 34.35 kg/m²   OB Status Pregnant   Smoking Status Every Day   BSA 1.97 m²            ASSESSMENT & PLAN    Cintia Justin is a 34 y.o.  at 16w0d here for the following concerns we addressed today:    Problem List Items Addressed This Visit          Ob-Gyn Problems    16 weeks gestation of pregnancy ([facility]) - Primary    Overview   Desired provider in labor: [] CNM  [] Physician   [] Either Acceptable  [x] Blood Products: [x] Yes, accepts [] No, needs counseling  [x] Initial BMI: 31.97   [x] Prenatal Labs: WNL  [x] Cervical Cancer Screening up to date: 25   [x] Rh status: B+  [x] Screen for IPV and Substance Use Risk: WNL at new OB  [x] Genetic Screening (cfDNA): risk reducing, XX  [x] First Trimester Anatomy Screen (11-13.6 wks):  WNL   [x] Baby ASA (initiated): rx sent at 11w  [x] Pregnancy dated by: 8w US    [] Anatomy US: (19-20 wks)  [] Fetal echo for h/o child with VSD  [] Federal Sterilization consent signed (if indicated):  [] 1hr GCT at 24-28wks:  [] Rhogam (if indicated):   [] Fetal Surveillance (if indicated):  [] Tdap (27-32 wks, may be given up to 36 wks if initial window missed):   [] RSV (32-36 wks) (Sept. to end of ):     [] Feeding Intentions:  [] Postpartum Birth control method:   [] GBS at 36 - 37 wks:  [] 39  weeks discussion of IOL vs. Expectant management:  [] Mode of delivery ( anticipated ):              Advised that dehydration can cause all her reported symptoms. Advised increase fluid intake, especially with electrolytes.   Return to care as scheduled for routine prenatal care sooner as needed      CODI Valencia

## 2025-07-08 NOTE — TELEPHONE ENCOUNTER
----- Message from Nurse Cailin CALIX sent at 7/8/2025 11:30 AM EDT -----  Regarding: headache  Contact: [phone]  Pt with Headache for the past five days rating 6/10, no relief from Acetaminophen. Afebrile.  Does not measure Blood pressure.  Also complains of lower back pain.  Disposition is see in 24 hours.  Pt missed yesterday's appointment due to a flat tire.  Please advise.

## 2025-07-12 PROBLEM — N89.8 VAGINAL DISCHARGE: Status: RESOLVED | Noted: 2024-10-14 | Resolved: 2025-07-12

## 2025-07-16 ENCOUNTER — HOSPITAL ENCOUNTER (OUTPATIENT)
Facility: HOSPITAL | Age: 35
Discharge: HOME | End: 2025-07-16
Attending: OBSTETRICS & GYNECOLOGY | Admitting: OBSTETRICS & GYNECOLOGY
Payer: COMMERCIAL

## 2025-07-16 ENCOUNTER — HOSPITAL ENCOUNTER (OUTPATIENT)
Facility: HOSPITAL | Age: 35
End: 2025-07-16
Attending: OBSTETRICS & GYNECOLOGY | Admitting: OBSTETRICS & GYNECOLOGY
Payer: COMMERCIAL

## 2025-07-16 VITALS
WEIGHT: 198 LBS | DIASTOLIC BLOOD PRESSURE: 54 MMHG | SYSTOLIC BLOOD PRESSURE: 106 MMHG | TEMPERATURE: 98.1 F | RESPIRATION RATE: 17 BRPM | HEART RATE: 62 BPM | OXYGEN SATURATION: 100 % | HEIGHT: 63 IN | BODY MASS INDEX: 35.08 KG/M2

## 2025-07-16 LAB
CLUE CELLS SPEC QL WET PREP: NORMAL
POC APPEARANCE, URINE: CLEAR
POC BILIRUBIN, URINE: NEGATIVE
POC BLOOD, URINE: ABNORMAL
POC COLOR, URINE: YELLOW
POC GLUCOSE, URINE: NEGATIVE MG/DL
POC KETONES, URINE: NEGATIVE MG/DL
POC LEUKOCYTES, URINE: NEGATIVE
POC NITRITE,URINE: NEGATIVE
POC PH, URINE: 7 PH
POC PROTEIN, URINE: NEGATIVE MG/DL
POC SPECIFIC GRAVITY, URINE: 1.02
POC UROBILINOGEN, URINE: 0.2 EU/DL
T VAGINALIS SPEC QL WET PREP: NORMAL
WBC VAG QL WET PREP: NORMAL
YEAST VAG QL WET PREP: NORMAL

## 2025-07-16 PROCEDURE — 99214 OFFICE O/P EST MOD 30 MIN: CPT | Performed by: NURSE PRACTITIONER

## 2025-07-16 PROCEDURE — 4500999001 HC ED NO CHARGE

## 2025-07-16 PROCEDURE — 2500000001 HC RX 250 WO HCPCS SELF ADMINISTERED DRUGS (ALT 637 FOR MEDICARE OP): Mod: SE | Performed by: NURSE PRACTITIONER

## 2025-07-16 PROCEDURE — 87210 SMEAR WET MOUNT SALINE/INK: CPT | Performed by: NURSE PRACTITIONER

## 2025-07-16 PROCEDURE — 87086 URINE CULTURE/COLONY COUNT: CPT | Performed by: NURSE PRACTITIONER

## 2025-07-16 PROCEDURE — 99214 OFFICE O/P EST MOD 30 MIN: CPT

## 2025-07-16 RX ORDER — ACETAMINOPHEN 325 MG/1
975 TABLET ORAL ONCE
Status: COMPLETED | OUTPATIENT
Start: 2025-07-16 | End: 2025-07-16

## 2025-07-16 RX ORDER — ONDANSETRON HYDROCHLORIDE 2 MG/ML
4 INJECTION, SOLUTION INTRAVENOUS EVERY 6 HOURS PRN
Status: DISCONTINUED | OUTPATIENT
Start: 2025-07-16 | End: 2025-07-16 | Stop reason: HOSPADM

## 2025-07-16 RX ORDER — ONDANSETRON 4 MG/1
4 TABLET, FILM COATED ORAL EVERY 6 HOURS PRN
Status: DISCONTINUED | OUTPATIENT
Start: 2025-07-16 | End: 2025-07-16 | Stop reason: HOSPADM

## 2025-07-16 RX ADMIN — ACETAMINOPHEN 975 MG: 325 TABLET ORAL at 21:04

## 2025-07-16 SDOH — HEALTH STABILITY: MENTAL HEALTH: SUICIDAL BEHAVIOR (LIFETIME): NO

## 2025-07-16 SDOH — SOCIAL STABILITY: SOCIAL INSECURITY: ARE YOU OR HAVE YOU BEEN THREATENED OR ABUSED PHYSICALLY, EMOTIONALLY, OR SEXUALLY BY ANYONE?: NO

## 2025-07-16 SDOH — SOCIAL STABILITY: SOCIAL INSECURITY: ABUSE SCREEN: ADULT

## 2025-07-16 SDOH — HEALTH STABILITY: MENTAL HEALTH: WERE YOU ABLE TO COMPLETE ALL THE BEHAVIORAL HEALTH SCREENINGS?: YES

## 2025-07-16 SDOH — SOCIAL STABILITY: SOCIAL INSECURITY: VERBAL ABUSE: DENIES

## 2025-07-16 SDOH — SOCIAL STABILITY: SOCIAL INSECURITY: PHYSICAL ABUSE: DENIES

## 2025-07-16 SDOH — SOCIAL STABILITY: SOCIAL INSECURITY: DO YOU FEEL ANYONE HAS EXPLOITED OR TAKEN ADVANTAGE OF YOU FINANCIALLY OR OF YOUR PERSONAL PROPERTY?: NO

## 2025-07-16 SDOH — HEALTH STABILITY: MENTAL HEALTH: WISH TO BE DEAD (PAST 1 MONTH): NO

## 2025-07-16 SDOH — HEALTH STABILITY: MENTAL HEALTH: NON-SPECIFIC ACTIVE SUICIDAL THOUGHTS (PAST 1 MONTH): NO

## 2025-07-16 SDOH — SOCIAL STABILITY: SOCIAL INSECURITY: HAVE YOU HAD THOUGHTS OF HARMING ANYONE ELSE?: NO

## 2025-07-16 SDOH — SOCIAL STABILITY: SOCIAL INSECURITY: ARE THERE ANY APPARENT SIGNS OF INJURIES/BEHAVIORS THAT COULD BE RELATED TO ABUSE/NEGLECT?: NO

## 2025-07-16 SDOH — SOCIAL STABILITY: SOCIAL INSECURITY: HAVE YOU HAD ANY THOUGHTS OF HARMING ANYONE ELSE?: NO

## 2025-07-16 SDOH — ECONOMIC STABILITY: HOUSING INSECURITY: DO YOU FEEL UNSAFE GOING BACK TO THE PLACE WHERE YOU ARE LIVING?: NO

## 2025-07-16 SDOH — SOCIAL STABILITY: SOCIAL INSECURITY: HAS ANYONE EVER THREATENED TO HURT YOUR FAMILY OR YOUR PETS?: NO

## 2025-07-16 SDOH — SOCIAL STABILITY: SOCIAL INSECURITY: DOES ANYONE TRY TO KEEP YOU FROM HAVING/CONTACTING OTHER FRIENDS OR DOING THINGS OUTSIDE YOUR HOME?: NO

## 2025-07-16 SDOH — HEALTH STABILITY: MENTAL HEALTH: HAVE YOU USED ANY SUBSTANCES (CANABIS, COCAINE, HEROIN, HALLUCINOGENS, INHALANTS, ETC.) IN THE PAST 12 MONTHS?: YES

## 2025-07-16 SDOH — HEALTH STABILITY: MENTAL HEALTH: HAVE YOU USED ANY PRESCRIPTION DRUGS OTHER THAN PRESCRIBED IN THE PAST 12 MONTHS?: NO

## 2025-07-16 ASSESSMENT — LIFESTYLE VARIABLES
AUDIT-C TOTAL SCORE: 0
HOW MANY STANDARD DRINKS CONTAINING ALCOHOL DO YOU HAVE ON A TYPICAL DAY: PATIENT DOES NOT DRINK
HOW OFTEN DO YOU HAVE 6 OR MORE DRINKS ON ONE OCCASION: NEVER
HOW OFTEN DO YOU HAVE A DRINK CONTAINING ALCOHOL: NEVER
AUDIT-C TOTAL SCORE: 0
SKIP TO QUESTIONS 9-10: 1

## 2025-07-16 ASSESSMENT — PATIENT HEALTH QUESTIONNAIRE - PHQ9
SUM OF ALL RESPONSES TO PHQ9 QUESTIONS 1 & 2: 0
2. FEELING DOWN, DEPRESSED OR HOPELESS: NOT AT ALL
1. LITTLE INTEREST OR PLEASURE IN DOING THINGS: NOT AT ALL

## 2025-07-16 ASSESSMENT — PAIN SCALES - GENERAL: PAINLEVEL_OUTOF10: 4

## 2025-07-16 NOTE — ED TRIAGE NOTES
Pt presents to the ED for abdominal pain and decreased fetal movement that start today. DD 12/23/25

## 2025-07-17 LAB — BACTERIA UR CULT: NORMAL
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
## 2025-07-24 ENCOUNTER — ROUTINE PRENATAL (OUTPATIENT)
Dept: OBSTETRICS AND GYNECOLOGY | Facility: CLINIC | Age: 35
End: 2025-07-24
Payer: COMMERCIAL

## 2025-07-24 VITALS
BODY MASS INDEX: 34.38 KG/M2 | WEIGHT: 194.1 LBS | SYSTOLIC BLOOD PRESSURE: 108 MMHG | HEART RATE: 116 BPM | DIASTOLIC BLOOD PRESSURE: 72 MMHG

## 2025-07-24 DIAGNOSIS — R10.9 ABDOMINAL CRAMPING AFFECTING PREGNANCY (HHS-HCC): Primary | ICD-10-CM

## 2025-07-24 DIAGNOSIS — Z3A.18 18 WEEKS GESTATION OF PREGNANCY (HHS-HCC): ICD-10-CM

## 2025-07-24 DIAGNOSIS — Z34.81 ENCOUNTER FOR SUPERVISION OF OTHER NORMAL PREGNANCY IN FIRST TRIMESTER: ICD-10-CM

## 2025-07-24 DIAGNOSIS — O26.899 ABDOMINAL CRAMPING AFFECTING PREGNANCY (HHS-HCC): Primary | ICD-10-CM

## 2025-07-24 PROCEDURE — 99213 OFFICE O/P EST LOW 20 MIN: CPT | Mod: TH | Performed by: ADVANCED PRACTICE MIDWIFE

## 2025-07-24 PROCEDURE — 99213 OFFICE O/P EST LOW 20 MIN: CPT | Performed by: ADVANCED PRACTICE MIDWIFE

## 2025-07-24 ASSESSMENT — ENCOUNTER SYMPTOMS
CARDIOVASCULAR NEGATIVE: 0
MUSCULOSKELETAL NEGATIVE: 0
GASTROINTESTINAL NEGATIVE: 0
EYES NEGATIVE: 0
PSYCHIATRIC NEGATIVE: 0
RESPIRATORY NEGATIVE: 0
ALLERGIC/IMMUNOLOGIC NEGATIVE: 0
ENDOCRINE NEGATIVE: 0
HEMATOLOGIC/LYMPHATIC NEGATIVE: 0
CONSTITUTIONAL NEGATIVE: 0
NEUROLOGICAL NEGATIVE: 0

## 2025-07-24 ASSESSMENT — PAIN - FUNCTIONAL ASSESSMENT: PAIN_FUNCTIONAL_ASSESSMENT: 0-10

## 2025-07-24 ASSESSMENT — PAIN SCALES - GENERAL: PAINLEVEL_OUTOF10: 0 - NO PAIN

## 2025-07-24 NOTE — PROGRESS NOTES
Ob Visit  25     SUBJECTIVE    HPI: Cintia Justin is a 34 y.o.  at 18w2d here for Return OB visit.  Cintia reports feeling fetal movement and lower abdominal cramping, no bleeding or LOF.   Anatomy U/S scheduled next week       OBJECTIVE  Visit Vitals  /72   Pulse (!) 116   Wt 88 kg (194 lb 1.6 oz)   LMP 2025   BMI 34.38 kg/m²   OB Status Pregnant   Smoking Status Every Day   BSA 1.98 m²            ASSESSMENT & PLAN    Cintia Justin is a 34 y.o.  at 18w2d here for the following concerns we addressed today:    Problem List Items Addressed This Visit          Ob-Gyn Problems    18 weeks gestation of pregnancy ([facility])    Overview   Desired provider in labor: [] CNM  [] Physician   [] Either Acceptable  [x] Blood Products: [x] Yes, accepts [] No, needs counseling  [x] Initial BMI: 31.97   [x] Prenatal Labs: WNL  [x] Cervical Cancer Screening up to date: 25   [x] Rh status: B+  [x] Screen for IPV and Substance Use Risk: WNL at new OB  [x] Genetic Screening (cfDNA): risk reducing, XX  [x] First Trimester Anatomy Screen (11-13.6 wks):  WNL   [x] Baby ASA (initiated): rx sent at 11w  [x] Pregnancy dated by: 8w US    [] Anatomy US: (19-20 wks)  [] Fetal echo for h/o child with VSD  [] Federal Sterilization consent signed (if indicated):  [] 1hr GCT at 24-28wks:  [] Rhogam (if indicated):   [] Fetal Surveillance (if indicated):  [] Tdap (27-32 wks, may be given up to 36 wks if initial window missed):   [] RSV (32-36 wks) (Sept. to end of ):     [] Feeding Intentions:  [] Postpartum Birth control method:   [] GBS at 36 - 37 wks:  [] 39 weeks discussion of IOL vs. Expectant management:  [] Mode of delivery ( anticipated ):            Other Visit Diagnoses         Abdominal cramping affecting pregnancy ([facility])    -  Primary    Relevant Orders    Urine culture    Follow Up In Obstetrics      Encounter for supervision of other normal pregnancy in first trimester                   Return to care in 4 weeks or sooner as needed      Shahana Zheng, APRN-CNM

## 2025-07-26 LAB — BACTERIA UR CULT: NORMAL

## 2025-07-28 PROBLEM — Z3A.19 19 WEEKS GESTATION OF PREGNANCY (HHS-HCC): Status: ACTIVE | Noted: 2025-05-13

## 2025-07-29 ENCOUNTER — HOSPITAL ENCOUNTER (OUTPATIENT)
Dept: RADIOLOGY | Facility: CLINIC | Age: 35
Discharge: HOME | End: 2025-07-29
Payer: COMMERCIAL

## 2025-07-29 DIAGNOSIS — Z3A.01 6 WEEKS GESTATION OF PREGNANCY (HHS-HCC): ICD-10-CM

## 2025-07-29 PROCEDURE — 76811 OB US DETAILED SNGL FETUS: CPT | Performed by: OBSTETRICS & GYNECOLOGY

## 2025-07-29 PROCEDURE — 76811 OB US DETAILED SNGL FETUS: CPT

## 2025-08-05 ENCOUNTER — HOSPITAL ENCOUNTER (OUTPATIENT)
Facility: HOSPITAL | Age: 35
Discharge: HOME | End: 2025-08-05
Attending: SPECIALIST | Admitting: SPECIALIST
Payer: COMMERCIAL

## 2025-08-05 ENCOUNTER — TELEPHONE (OUTPATIENT)
Dept: OBSTETRICS AND GYNECOLOGY | Facility: CLINIC | Age: 35
End: 2025-08-05
Payer: COMMERCIAL

## 2025-08-05 VITALS
DIASTOLIC BLOOD PRESSURE: 54 MMHG | SYSTOLIC BLOOD PRESSURE: 109 MMHG | RESPIRATION RATE: 18 BRPM | TEMPERATURE: 98.8 F | HEART RATE: 75 BPM | HEIGHT: 63 IN | WEIGHT: 199.3 LBS | OXYGEN SATURATION: 100 % | BODY MASS INDEX: 35.31 KG/M2

## 2025-08-05 DIAGNOSIS — M54.9 BACK PAIN AFFECTING PREGNANCY: Primary | ICD-10-CM

## 2025-08-05 DIAGNOSIS — D50.9 IRON DEFICIENCY ANEMIA DURING PREGNANCY: ICD-10-CM

## 2025-08-05 DIAGNOSIS — O99.019 IRON DEFICIENCY ANEMIA DURING PREGNANCY: ICD-10-CM

## 2025-08-05 DIAGNOSIS — O99.891 BACK PAIN AFFECTING PREGNANCY: Primary | ICD-10-CM

## 2025-08-05 DIAGNOSIS — Z3A.20 20 WEEKS GESTATION OF PREGNANCY (HHS-HCC): ICD-10-CM

## 2025-08-05 LAB
APTT PPP: 29 SECONDS (ref 26–36)
BILIRUBIN, POC: NEGATIVE
BLOOD URINE, POC: POSITIVE
CLARITY, POC: CLEAR
COLOR, POC: YELLOW
ERYTHROCYTE [DISTWIDTH] IN BLOOD BY AUTOMATED COUNT: 12.8 % (ref 11.5–14.5)
FERRITIN SERPL-MCNC: 68 NG/ML
FIBRINOGEN PPP-MCNC: 355 MG/DL (ref 200–400)
GLUCOSE URINE, POC: NEGATIVE
HCT VFR BLD AUTO: 31.1 % (ref 36–46)
HGB BLD-MCNC: 9.7 G/DL (ref 12–16)
INR PPP: 0.9 (ref 0.9–1.1)
IRON SATN MFR SERPL: 16 %
IRON SERPL-MCNC: 54 UG/DL
KETONES, POC: NEGATIVE
LEUKOCYTE EST, POC: NEGATIVE
MCH RBC QN AUTO: 28.9 PG (ref 26–34)
MCHC RBC AUTO-ENTMCNC: 31.2 G/DL (ref 32–36)
MCV RBC AUTO: 93 FL (ref 80–100)
NITRITE, POC: NEGATIVE
NRBC BLD-RTO: 0 /100 WBCS (ref 0–0)
PH, POC: 7
PLATELET # BLD AUTO: 260 X10*3/UL (ref 150–450)
POC APPEARANCE OF BODY FLUID: CLEAR
PROTHROMBIN TIME: 10.4 SECONDS (ref 9.8–12.4)
RBC # BLD AUTO: 3.36 X10*6/UL (ref 4–5.2)
SPECIFIC GRAVITY, POC: 1.01
TIBC SERPL-MCNC: 333 UG/DL
UIBC SERPL-MCNC: 279 UG/DL
URINE PROTEIN, POC: NEGATIVE
UROBILINOGEN, POC: 0.2
WBC # BLD AUTO: 8.1 X10*3/UL (ref 4.4–11.3)

## 2025-08-05 PROCEDURE — 85610 PROTHROMBIN TIME: CPT

## 2025-08-05 PROCEDURE — 99214 OFFICE O/P EST MOD 30 MIN: CPT

## 2025-08-05 PROCEDURE — 36415 COLL VENOUS BLD VENIPUNCTURE: CPT

## 2025-08-05 PROCEDURE — 81002 URINALYSIS NONAUTO W/O SCOPE: CPT

## 2025-08-05 PROCEDURE — 82728 ASSAY OF FERRITIN: CPT

## 2025-08-05 PROCEDURE — 85027 COMPLETE CBC AUTOMATED: CPT

## 2025-08-05 PROCEDURE — 83550 IRON BINDING TEST: CPT

## 2025-08-05 PROCEDURE — 4500999001 HC ED NO CHARGE

## 2025-08-05 PROCEDURE — 85384 FIBRINOGEN ACTIVITY: CPT

## 2025-08-05 PROCEDURE — 99223 1ST HOSP IP/OBS HIGH 75: CPT

## 2025-08-05 PROCEDURE — 2500000005 HC RX 250 GENERAL PHARMACY W/O HCPCS: Mod: SE

## 2025-08-05 RX ORDER — LABETALOL HYDROCHLORIDE 5 MG/ML
20 INJECTION, SOLUTION INTRAVENOUS ONCE AS NEEDED
Status: DISCONTINUED | OUTPATIENT
Start: 2025-08-05 | End: 2025-08-05 | Stop reason: HOSPADM

## 2025-08-05 RX ORDER — FERROUS SULFATE, DRIED 159(45)MG
TABLET, EXTENDED RELEASE ORAL
Qty: 90 TABLET | Refills: 2 | Status: SHIPPED | OUTPATIENT
Start: 2025-08-05

## 2025-08-05 RX ORDER — LIDOCAINE 560 MG/1
2 PATCH PERCUTANEOUS; TOPICAL; TRANSDERMAL DAILY
Status: DISCONTINUED | OUTPATIENT
Start: 2025-08-05 | End: 2025-08-05 | Stop reason: HOSPADM

## 2025-08-05 RX ORDER — ONDANSETRON 4 MG/1
4 TABLET, FILM COATED ORAL EVERY 6 HOURS PRN
Status: DISCONTINUED | OUTPATIENT
Start: 2025-08-05 | End: 2025-08-05 | Stop reason: HOSPADM

## 2025-08-05 RX ORDER — ONDANSETRON HYDROCHLORIDE 2 MG/ML
4 INJECTION, SOLUTION INTRAVENOUS EVERY 6 HOURS PRN
Status: DISCONTINUED | OUTPATIENT
Start: 2025-08-05 | End: 2025-08-05 | Stop reason: HOSPADM

## 2025-08-05 RX ORDER — LIDOCAINE HYDROCHLORIDE 10 MG/ML
0.5 INJECTION, SOLUTION INFILTRATION; PERINEURAL ONCE AS NEEDED
Status: DISCONTINUED | OUTPATIENT
Start: 2025-08-05 | End: 2025-08-05 | Stop reason: HOSPADM

## 2025-08-05 RX ORDER — HYDRALAZINE HYDROCHLORIDE 20 MG/ML
5 INJECTION INTRAMUSCULAR; INTRAVENOUS ONCE AS NEEDED
Status: DISCONTINUED | OUTPATIENT
Start: 2025-08-05 | End: 2025-08-05 | Stop reason: HOSPADM

## 2025-08-05 RX ORDER — CYCLOBENZAPRINE HCL 5 MG
5 TABLET ORAL 3 TIMES DAILY PRN
Qty: 9 TABLET | Refills: 0 | Status: SHIPPED | OUTPATIENT
Start: 2025-08-05

## 2025-08-05 RX ORDER — LIDOCAINE 50 MG/G
2 PATCH TOPICAL DAILY PRN
Qty: 20 PATCH | Refills: 0 | Status: SHIPPED | OUTPATIENT
Start: 2025-08-05

## 2025-08-05 RX ADMIN — LIDOCAINE 4% 2 PATCH: 40 PATCH TOPICAL at 16:13

## 2025-08-05 SDOH — HEALTH STABILITY: MENTAL HEALTH: NON-SPECIFIC ACTIVE SUICIDAL THOUGHTS (PAST 1 MONTH): NO

## 2025-08-05 SDOH — HEALTH STABILITY: MENTAL HEALTH: SUICIDAL BEHAVIOR (LIFETIME): NO

## 2025-08-05 SDOH — HEALTH STABILITY: MENTAL HEALTH: WISH TO BE DEAD (PAST 1 MONTH): NO

## 2025-08-05 SDOH — HEALTH STABILITY: MENTAL HEALTH: HAVE YOU USED ANY PRESCRIPTION DRUGS OTHER THAN PRESCRIBED IN THE PAST 12 MONTHS?: NO

## 2025-08-05 SDOH — SOCIAL STABILITY: SOCIAL INSECURITY: HAVE YOU HAD THOUGHTS OF HARMING ANYONE ELSE?: NO

## 2025-08-05 SDOH — HEALTH STABILITY: MENTAL HEALTH: HAVE YOU USED ANY SUBSTANCES (CANABIS, COCAINE, HEROIN, HALLUCINOGENS, INHALANTS, ETC.) IN THE PAST 12 MONTHS?: NO

## 2025-08-05 SDOH — SOCIAL STABILITY: SOCIAL INSECURITY: ARE THERE ANY APPARENT SIGNS OF INJURIES/BEHAVIORS THAT COULD BE RELATED TO ABUSE/NEGLECT?: NO

## 2025-08-05 SDOH — ECONOMIC STABILITY: HOUSING INSECURITY: DO YOU FEEL UNSAFE GOING BACK TO THE PLACE WHERE YOU ARE LIVING?: NO

## 2025-08-05 SDOH — SOCIAL STABILITY: SOCIAL INSECURITY: DOES ANYONE TRY TO KEEP YOU FROM HAVING/CONTACTING OTHER FRIENDS OR DOING THINGS OUTSIDE YOUR HOME?: NO

## 2025-08-05 SDOH — SOCIAL STABILITY: SOCIAL INSECURITY: HAS ANYONE EVER THREATENED TO HURT YOUR FAMILY OR YOUR PETS?: NO

## 2025-08-05 SDOH — SOCIAL STABILITY: SOCIAL INSECURITY: PHYSICAL ABUSE: DENIES

## 2025-08-05 SDOH — SOCIAL STABILITY: SOCIAL INSECURITY: ABUSE SCREEN: ADULT

## 2025-08-05 SDOH — SOCIAL STABILITY: SOCIAL INSECURITY: DO YOU FEEL ANYONE HAS EXPLOITED OR TAKEN ADVANTAGE OF YOU FINANCIALLY OR OF YOUR PERSONAL PROPERTY?: NO

## 2025-08-05 SDOH — SOCIAL STABILITY: SOCIAL INSECURITY: ARE YOU OR HAVE YOU BEEN THREATENED OR ABUSED PHYSICALLY, EMOTIONALLY, OR SEXUALLY BY ANYONE?: NO

## 2025-08-05 SDOH — SOCIAL STABILITY: SOCIAL INSECURITY: VERBAL ABUSE: DENIES

## 2025-08-05 SDOH — HEALTH STABILITY: MENTAL HEALTH: WERE YOU ABLE TO COMPLETE ALL THE BEHAVIORAL HEALTH SCREENINGS?: YES

## 2025-08-05 ASSESSMENT — PAIN SCALES - GENERAL
PAINLEVEL_OUTOF10: 8
PAINLEVEL_OUTOF10: 3
PAINLEVEL_OUTOF10: 7
PAINLEVEL_OUTOF10: 7
PAINLEVEL_OUTOF10: 3
PAINLEVEL_OUTOF10: 7
PAINLEVEL_OUTOF10: 7

## 2025-08-05 ASSESSMENT — PAIN DESCRIPTION - ORIENTATION: ORIENTATION: ANTERIOR

## 2025-08-05 ASSESSMENT — LIFESTYLE VARIABLES
AUDIT-C TOTAL SCORE: 0
AUDIT-C TOTAL SCORE: 0
HOW OFTEN DO YOU HAVE A DRINK CONTAINING ALCOHOL: NEVER
HOW MANY STANDARD DRINKS CONTAINING ALCOHOL DO YOU HAVE ON A TYPICAL DAY: PATIENT DOES NOT DRINK
SKIP TO QUESTIONS 9-10: 1
HOW OFTEN DO YOU HAVE 6 OR MORE DRINKS ON ONE OCCASION: NEVER

## 2025-08-05 ASSESSMENT — PATIENT HEALTH QUESTIONNAIRE - PHQ9
1. LITTLE INTEREST OR PLEASURE IN DOING THINGS: NOT AT ALL
2. FEELING DOWN, DEPRESSED OR HOPELESS: NOT AT ALL
SUM OF ALL RESPONSES TO PHQ9 QUESTIONS 1 & 2: 0

## 2025-08-05 ASSESSMENT — PAIN DESCRIPTION - LOCATION: LOCATION: ABDOMEN

## 2025-08-05 ASSESSMENT — PAIN DESCRIPTION - DESCRIPTORS: DESCRIPTORS: ACHING

## 2025-08-05 ASSESSMENT — PAIN - FUNCTIONAL ASSESSMENT: PAIN_FUNCTIONAL_ASSESSMENT: 0-10

## 2025-08-05 NOTE — LETTER
August 5, 2025     Patient: Cintia Justin   YOB: 1990   Date of Visit: 8/5/2025       To Whom It May Concern:    Cintia Justin was seen for medical care and should be excused from work until 8/9/2025. It is important for her medical condition that she is safe at work.    If you have any questions or concerns, please don't hesitate to call.       Sincerely,      Amy Childress MD

## 2025-08-05 NOTE — LETTER
August 12, 2025     Patient: Cintia Justin   YOB: 1990   Date of Visit: 8/5/2025       To Whom It May Concern:    Cintia Justin was seen in my clinic on 8/5/2025 at . Please excuse Cintia for her absence from work on this day to make the appointment.    If you have any questions or concerns, please don't hesitate to call.         Sincerely,         No name on file        CC: No Recipients
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
## 2025-08-11 PROBLEM — O20.9: Status: RESOLVED | Noted: 2025-06-16 | Resolved: 2025-08-11

## 2025-08-11 PROBLEM — A59.9 TRICHOMONIASIS: Status: RESOLVED | Noted: 2024-10-14 | Resolved: 2025-08-11

## 2025-08-11 PROBLEM — M54.9 BACK PAIN AFFECTING PREGNANCY: Status: RESOLVED | Noted: 2025-08-05 | Resolved: 2025-08-11

## 2025-08-11 PROBLEM — O99.891 BACK PAIN AFFECTING PREGNANCY: Status: RESOLVED | Noted: 2025-08-05 | Resolved: 2025-08-11

## 2025-08-12 ENCOUNTER — ROUTINE PRENATAL (OUTPATIENT)
Dept: OBSTETRICS AND GYNECOLOGY | Facility: CLINIC | Age: 35
End: 2025-08-12
Payer: COMMERCIAL

## 2025-08-12 VITALS
WEIGHT: 198.3 LBS | SYSTOLIC BLOOD PRESSURE: 112 MMHG | DIASTOLIC BLOOD PRESSURE: 71 MMHG | BODY MASS INDEX: 35.13 KG/M2 | HEART RATE: 78 BPM

## 2025-08-12 DIAGNOSIS — Z34.82 ENCOUNTER FOR SUPERVISION OF OTHER NORMAL PREGNANCY IN SECOND TRIMESTER: ICD-10-CM

## 2025-08-12 DIAGNOSIS — Z3A.20 20 WEEKS GESTATION OF PREGNANCY (HHS-HCC): ICD-10-CM

## 2025-08-12 DIAGNOSIS — O99.331 MATERNAL TOBACCO USE IN FIRST TRIMESTER (HHS-HCC): ICD-10-CM

## 2025-08-12 DIAGNOSIS — Z3A.21 21 WEEKS GESTATION OF PREGNANCY (HHS-HCC): Primary | ICD-10-CM

## 2025-08-12 DIAGNOSIS — O99.311: ICD-10-CM

## 2025-08-12 PROCEDURE — 99212 OFFICE O/P EST SF 10 MIN: CPT | Performed by: ADVANCED PRACTICE MIDWIFE

## 2025-08-12 PROCEDURE — 99213 OFFICE O/P EST LOW 20 MIN: CPT | Performed by: ADVANCED PRACTICE MIDWIFE

## 2025-08-12 ASSESSMENT — PAIN SCALES - GENERAL: PAINLEVEL_OUTOF10: 0 - NO PAIN

## 2025-08-12 ASSESSMENT — ENCOUNTER SYMPTOMS
PSYCHIATRIC NEGATIVE: 0
NEUROLOGICAL NEGATIVE: 0
CONSTITUTIONAL NEGATIVE: 0
ALLERGIC/IMMUNOLOGIC NEGATIVE: 0
RESPIRATORY NEGATIVE: 0
GASTROINTESTINAL NEGATIVE: 0
EYES NEGATIVE: 0
CARDIOVASCULAR NEGATIVE: 0
MUSCULOSKELETAL NEGATIVE: 0
HEMATOLOGIC/LYMPHATIC NEGATIVE: 0
ENDOCRINE NEGATIVE: 0

## 2025-08-12 ASSESSMENT — PAIN - FUNCTIONAL ASSESSMENT: PAIN_FUNCTIONAL_ASSESSMENT: 0-10

## 2025-08-25 ENCOUNTER — TELEPHONE (OUTPATIENT)
Dept: OBSTETRICS AND GYNECOLOGY | Facility: CLINIC | Age: 35
End: 2025-08-25
Payer: COMMERCIAL

## 2025-08-25 ENCOUNTER — HOSPITAL ENCOUNTER (OUTPATIENT)
Facility: HOSPITAL | Age: 35
End: 2025-08-25
Attending: OBSTETRICS & GYNECOLOGY | Admitting: OBSTETRICS & GYNECOLOGY
Payer: COMMERCIAL

## 2025-08-25 ENCOUNTER — HOSPITAL ENCOUNTER (OUTPATIENT)
Facility: HOSPITAL | Age: 35
Discharge: HOME | End: 2025-08-25
Attending: OBSTETRICS & GYNECOLOGY | Admitting: OBSTETRICS & GYNECOLOGY
Payer: COMMERCIAL

## 2025-08-25 VITALS
HEIGHT: 63 IN | TEMPERATURE: 96.8 F | WEIGHT: 205.03 LBS | BODY MASS INDEX: 36.33 KG/M2 | OXYGEN SATURATION: 99 % | DIASTOLIC BLOOD PRESSURE: 54 MMHG | HEART RATE: 77 BPM | RESPIRATION RATE: 17 BRPM | SYSTOLIC BLOOD PRESSURE: 114 MMHG

## 2025-08-25 LAB
POC APPEARANCE, URINE: CLEAR
POC BILIRUBIN, URINE: NEGATIVE
POC BLOOD, URINE: ABNORMAL
POC COLOR, URINE: YELLOW
POC GLUCOSE, URINE: NEGATIVE MG/DL
POC KETONES, URINE: NEGATIVE MG/DL
POC LEUKOCYTES, URINE: NEGATIVE
POC NITRITE,URINE: NEGATIVE
POC PH, URINE: 7 PH
POC PROTEIN, URINE: NEGATIVE MG/DL
POC SPECIFIC GRAVITY, URINE: 1.01
POC UROBILINOGEN, URINE: 0.2 EU/DL

## 2025-08-25 PROCEDURE — 2500000001 HC RX 250 WO HCPCS SELF ADMINISTERED DRUGS (ALT 637 FOR MEDICARE OP): Mod: SE | Performed by: NURSE PRACTITIONER

## 2025-08-25 PROCEDURE — 99215 OFFICE O/P EST HI 40 MIN: CPT

## 2025-08-25 PROCEDURE — 2500000005 HC RX 250 GENERAL PHARMACY W/O HCPCS: Mod: SE | Performed by: NURSE PRACTITIONER

## 2025-08-25 PROCEDURE — 99213 OFFICE O/P EST LOW 20 MIN: CPT | Performed by: NURSE PRACTITIONER

## 2025-08-25 PROCEDURE — 4500999001 HC ED NO CHARGE

## 2025-08-25 RX ORDER — LIDOCAINE 560 MG/1
1 PATCH PERCUTANEOUS; TOPICAL; TRANSDERMAL ONCE
Status: DISCONTINUED | OUTPATIENT
Start: 2025-08-25 | End: 2025-08-25 | Stop reason: HOSPADM

## 2025-08-25 RX ORDER — ONDANSETRON 4 MG/1
4 TABLET, FILM COATED ORAL EVERY 6 HOURS PRN
Status: DISCONTINUED | OUTPATIENT
Start: 2025-08-25 | End: 2025-08-25 | Stop reason: HOSPADM

## 2025-08-25 RX ORDER — ACETAMINOPHEN 325 MG/1
975 TABLET ORAL ONCE
Status: COMPLETED | OUTPATIENT
Start: 2025-08-25 | End: 2025-08-25

## 2025-08-25 RX ORDER — ONDANSETRON HYDROCHLORIDE 2 MG/ML
4 INJECTION, SOLUTION INTRAVENOUS EVERY 6 HOURS PRN
Status: DISCONTINUED | OUTPATIENT
Start: 2025-08-25 | End: 2025-08-25 | Stop reason: HOSPADM

## 2025-08-25 RX ORDER — LIDOCAINE 50 MG/G
1 PATCH TOPICAL DAILY
Qty: 15 PATCH | Refills: 0 | Status: CANCELLED | OUTPATIENT
Start: 2025-08-25

## 2025-08-25 RX ORDER — LIDOCAINE 50 MG/G
1 PATCH TOPICAL DAILY
Status: CANCELLED | OUTPATIENT
Start: 2025-08-25

## 2025-08-25 RX ADMIN — ACETAMINOPHEN 975 MG: 325 TABLET ORAL at 18:31

## 2025-08-25 RX ADMIN — LIDOCAINE 4% 1 PATCH: 40 PATCH TOPICAL at 18:31

## 2025-08-25 SDOH — HEALTH STABILITY: MENTAL HEALTH: NON-SPECIFIC ACTIVE SUICIDAL THOUGHTS (PAST 1 MONTH): NO

## 2025-08-25 SDOH — SOCIAL STABILITY: SOCIAL INSECURITY: DOES ANYONE TRY TO KEEP YOU FROM HAVING/CONTACTING OTHER FRIENDS OR DOING THINGS OUTSIDE YOUR HOME?: NO

## 2025-08-25 SDOH — HEALTH STABILITY: MENTAL HEALTH: WISH TO BE DEAD (PAST 1 MONTH): NO

## 2025-08-25 SDOH — ECONOMIC STABILITY: HOUSING INSECURITY: DO YOU FEEL UNSAFE GOING BACK TO THE PLACE WHERE YOU ARE LIVING?: NO

## 2025-08-25 SDOH — HEALTH STABILITY: MENTAL HEALTH: SUICIDAL BEHAVIOR (LIFETIME): NO

## 2025-08-25 SDOH — SOCIAL STABILITY: SOCIAL INSECURITY: HAS ANYONE EVER THREATENED TO HURT YOUR FAMILY OR YOUR PETS?: NO

## 2025-08-25 SDOH — SOCIAL STABILITY: SOCIAL INSECURITY: PHYSICAL ABUSE: DENIES

## 2025-08-25 SDOH — SOCIAL STABILITY: SOCIAL INSECURITY: ARE THERE ANY APPARENT SIGNS OF INJURIES/BEHAVIORS THAT COULD BE RELATED TO ABUSE/NEGLECT?: NO

## 2025-08-25 SDOH — SOCIAL STABILITY: SOCIAL INSECURITY: ARE YOU OR HAVE YOU BEEN THREATENED OR ABUSED PHYSICALLY, EMOTIONALLY, OR SEXUALLY BY ANYONE?: NO

## 2025-08-25 SDOH — SOCIAL STABILITY: SOCIAL INSECURITY: VERBAL ABUSE: DENIES

## 2025-08-25 SDOH — SOCIAL STABILITY: SOCIAL INSECURITY: ABUSE SCREEN: ADULT

## 2025-08-25 SDOH — SOCIAL STABILITY: SOCIAL INSECURITY: HAVE YOU HAD THOUGHTS OF HARMING ANYONE ELSE?: NO

## 2025-08-25 SDOH — SOCIAL STABILITY: SOCIAL INSECURITY: HAVE YOU HAD ANY THOUGHTS OF HARMING ANYONE ELSE?: NO

## 2025-08-25 SDOH — SOCIAL STABILITY: SOCIAL INSECURITY: DO YOU FEEL ANYONE HAS EXPLOITED OR TAKEN ADVANTAGE OF YOU FINANCIALLY OR OF YOUR PERSONAL PROPERTY?: NO

## 2025-08-25 SDOH — HEALTH STABILITY: MENTAL HEALTH: HAVE YOU USED ANY SUBSTANCES (CANABIS, COCAINE, HEROIN, HALLUCINOGENS, INHALANTS, ETC.) IN THE PAST 12 MONTHS?: NO

## 2025-08-25 SDOH — HEALTH STABILITY: MENTAL HEALTH: HAVE YOU USED ANY PRESCRIPTION DRUGS OTHER THAN PRESCRIBED IN THE PAST 12 MONTHS?: NO

## 2025-08-25 SDOH — HEALTH STABILITY: MENTAL HEALTH: WERE YOU ABLE TO COMPLETE ALL THE BEHAVIORAL HEALTH SCREENINGS?: YES

## 2025-08-25 ASSESSMENT — PATIENT HEALTH QUESTIONNAIRE - PHQ9
1. LITTLE INTEREST OR PLEASURE IN DOING THINGS: NOT AT ALL
SUM OF ALL RESPONSES TO PHQ9 QUESTIONS 1 & 2: 0
2. FEELING DOWN, DEPRESSED OR HOPELESS: NOT AT ALL

## 2025-08-25 ASSESSMENT — LIFESTYLE VARIABLES
HOW MANY STANDARD DRINKS CONTAINING ALCOHOL DO YOU HAVE ON A TYPICAL DAY: PATIENT DOES NOT DRINK
AUDIT-C TOTAL SCORE: 0
HOW OFTEN DO YOU HAVE 6 OR MORE DRINKS ON ONE OCCASION: NEVER
AUDIT-C TOTAL SCORE: 0
HOW OFTEN DO YOU HAVE A DRINK CONTAINING ALCOHOL: NEVER
SKIP TO QUESTIONS 9-10: 1

## 2025-08-25 ASSESSMENT — PAIN SCALES - GENERAL
PAINLEVEL_OUTOF10: 7
PAINLEVEL_OUTOF10: 7
PAINLEVEL_OUTOF10: 8

## 2025-08-26 ENCOUNTER — HOSPITAL ENCOUNTER (OUTPATIENT)
Dept: RADIOLOGY | Facility: CLINIC | Age: 35
Discharge: HOME | End: 2025-08-26
Payer: COMMERCIAL

## 2025-08-26 DIAGNOSIS — Z3A.01 6 WEEKS GESTATION OF PREGNANCY (HHS-HCC): ICD-10-CM

## 2025-08-26 LAB — HOLD SPECIMEN: NORMAL

## 2025-08-26 PROCEDURE — 76816 OB US FOLLOW-UP PER FETUS: CPT | Performed by: OBSTETRICS & GYNECOLOGY

## 2025-08-26 PROCEDURE — 76816 OB US FOLLOW-UP PER FETUS: CPT

## 2025-08-28 ENCOUNTER — TELEPHONE (OUTPATIENT)
Dept: OBSTETRICS AND GYNECOLOGY | Facility: CLINIC | Age: 35
End: 2025-08-28
Payer: COMMERCIAL

## 2025-09-02 ENCOUNTER — APPOINTMENT (OUTPATIENT)
Dept: OBSTETRICS AND GYNECOLOGY | Facility: CLINIC | Age: 35
End: 2025-09-02
Payer: COMMERCIAL